# Patient Record
Sex: MALE | Race: WHITE | NOT HISPANIC OR LATINO | Employment: UNEMPLOYED | URBAN - METROPOLITAN AREA
[De-identification: names, ages, dates, MRNs, and addresses within clinical notes are randomized per-mention and may not be internally consistent; named-entity substitution may affect disease eponyms.]

---

## 2022-05-18 ENCOUNTER — OFFICE VISIT (OUTPATIENT)
Dept: FAMILY MEDICINE CLINIC | Facility: CLINIC | Age: 57
End: 2022-05-18
Payer: COMMERCIAL

## 2022-05-18 VITALS
OXYGEN SATURATION: 98 % | DIASTOLIC BLOOD PRESSURE: 76 MMHG | BODY MASS INDEX: 21.52 KG/M2 | WEIGHT: 142 LBS | TEMPERATURE: 97.6 F | SYSTOLIC BLOOD PRESSURE: 124 MMHG | RESPIRATION RATE: 16 BRPM | HEART RATE: 71 BPM | HEIGHT: 68 IN

## 2022-05-18 DIAGNOSIS — R41.3 MEMORY DEFICIT: Primary | ICD-10-CM

## 2022-05-18 DIAGNOSIS — J30.89 NON-SEASONAL ALLERGIC RHINITIS DUE TO OTHER ALLERGIC TRIGGER: ICD-10-CM

## 2022-05-18 DIAGNOSIS — Z87.891 PERSONAL HISTORY OF NICOTINE DEPENDENCE: ICD-10-CM

## 2022-05-18 DIAGNOSIS — Z12.2 ENCOUNTER FOR SCREENING FOR LUNG CANCER: ICD-10-CM

## 2022-05-18 DIAGNOSIS — Z00.00 HEALTHCARE MAINTENANCE: ICD-10-CM

## 2022-05-18 DIAGNOSIS — Z12.5 PROSTATE CANCER SCREENING: ICD-10-CM

## 2022-05-18 DIAGNOSIS — E78.49 OTHER HYPERLIPIDEMIA: ICD-10-CM

## 2022-05-18 PROCEDURE — 99203 OFFICE O/P NEW LOW 30 MIN: CPT | Performed by: FAMILY MEDICINE

## 2022-05-18 PROCEDURE — G0438 PPPS, INITIAL VISIT: HCPCS | Performed by: FAMILY MEDICINE

## 2022-05-18 RX ORDER — FEXOFENADINE HCL 180 MG/1
180 TABLET ORAL DAILY PRN
COMMUNITY

## 2022-05-18 RX ORDER — DONEPEZIL HYDROCHLORIDE 5 MG/1
TABLET, FILM COATED ORAL
COMMUNITY
Start: 2022-05-11 | End: 2022-05-23 | Stop reason: SDUPTHER

## 2022-05-18 RX ORDER — DIPHENOXYLATE HYDROCHLORIDE AND ATROPINE SULFATE 2.5; .025 MG/1; MG/1
1 TABLET ORAL DAILY
COMMUNITY

## 2022-05-18 NOTE — PROGRESS NOTES
Assessment and Plan:     Problem List Items Addressed This Visit        Respiratory    Allergic rhinitis due to allergen     Stable on Allegra  Continue same  Will continue to monitor  I will consider starting Flonase nasal spray if symptoms are not improving  Come back in 1 month  Other    Personal history of nicotine dependence      I am going to check chest CT for lung cancer screening  Relevant Orders    CT lung screening program    Encounter for screening for lung cancer    Relevant Orders    CT lung screening program    Healthcare maintenance     It was discussed about immunizations, diet, exercise and safety measures  Relevant Orders    CBC and differential    Comprehensive metabolic panel    Hemoglobin A1C    Lipid Panel with Direct LDL reflex    TSH, 3rd generation with Free T4 reflex    Prostate cancer screening    Relevant Orders    PSA, Total Screen    Memory deficit - Primary     Continue on Aricept  Will continue to monitor  Relevant Medications    donepezil (ARICEPT) 5 mg tablet    Other hyperlipidemia     Discussed about low-fat diet  I am going to check fasting lipid profile  Depression Screening and Follow-up Plan: Patient was screened for depression during today's encounter  They screened negative with a PHQ-2 score of 0  Preventive health issues were discussed with patient, and age appropriate screening tests were ordered as noted in patient's After Visit Summary  Personalized health advice and appropriate referrals for health education or preventive services given if needed, as noted in patient's After Visit Summary       History of Present Illness:     Patient presents for Medicare Annual Wellness visit    Patient Care Team:  Selvin Graham MD as PCP - General (Family Medicine)     Problem List:     Patient Active Problem List   Diagnosis    Personal history of nicotine dependence     Encounter for screening for lung cancer    Healthcare maintenance    Prostate cancer screening    Memory deficit    Other hyperlipidemia    Allergic rhinitis due to allergen      Past Medical and Surgical History:     Past Medical History:   Diagnosis Date    Brain trauma [facility])      Past Surgical History:   Procedure Laterality Date    KNEE SURGERY        Family History:     Family History   Problem Relation Age of Onset    Cancer Mother    [facility] Breast cancer Mother     Skin cancer Mother    [facility] Cancer Father     Depression Brother       Social History:     Social History     Socioeconomic History    Marital status: Single     Spouse name: None    Number of children: None    Years of education: None    Highest education level: None   Occupational History    None   Tobacco Use    Smoking status: Former Smoker     Packs/day: 1 00     Years: 25 00     Pack years: 25 00     Types: Cigarettes    Smokeless tobacco: Never Used   Vaping Use    Vaping Use: Never used   Substance and Sexual Activity    Alcohol use: Not Currently    Drug use: Not Currently     Types: Marijuana, "Crack" cocaine     Comment: 30 years ago    Sexual activity: None   Other Topics Concern    None   Social History Narrative    None     Social Determinants of Health     Financial Resource Strain: Not on file   Food Insecurity: Not on file   Transportation Needs: Not on file   Physical Activity: Not on file   Stress: Not on file   Social Connections: Not on file   Intimate Partner Violence: Not on file   Housing Stability: Not on file      Medications and Allergies:     Current Outpatient Medications   Medication Sig Dispense Refill    donepezil (ARICEPT) 5 mg tablet Take 1 tablet (5 mg total) by mouth daily at bedtime 90 tablet 1    fexofenadine (ALLEGRA) 180 MG tablet Take 180 mg by mouth as needed in the morning   multivitamin (THERAGRAN) TABS Take 1 tablet by mouth in the morning  No current facility-administered medications for this visit  Allergies   Allergen Reactions    Horse-Derived Products Other (See Comments)     As child        Immunizations: There is no immunization history on file for this patient  Health Maintenance:         Topic Date Due    Hepatitis C Screening  Never done    HIV Screening  Never done    Colorectal Cancer Screening  Never done         Topic Date Due    DTaP,Tdap,and Td Vaccines (1 - Tdap) Never done    COVID-19 Vaccine (4 - Booster for Mcmahan Peter series) 04/22/2022      Medicare Health Risk Assessment:     /76 (BP Location: Left arm, Patient Position: Sitting, Cuff Size: Adult)   Pulse 71   Temp 97 6 °F (36 4 °C) (Tympanic)   Resp 16   Ht 5' 8" (1 727 m)   Wt 64 4 kg (142 lb)   SpO2 98%   BMI 21 59 kg/m²      [facility] Well[facility] visit  Health Risk Assessment:   Patient rates overall health as very good  Patient feels that their physical health rating is slightly better  Patient is very satisfied with their life  Eyesight was rated as slightly worse  Hearing was rated as same  Patient feels that their emotional and mental health rating is slightly better  Patients states they are sometimes angry  Patient states they are sometimes unusually tired/fatigued  Pain experienced in the last 7 days has been none  Patient states that he has experienced no weight loss or gain in last 6 months  Depression Screening:   PHQ-2 Score: 0      Fall Risk Screening: In the past year, patient has experienced: no history of falling in past year      Home Safety:  Patient does not have trouble with stairs inside or outside of their home  Patient has working smoke alarms and has working carbon monoxide detector  Home safety hazards include: none  Nutrition:   Current diet is Regular  Medications:   Patient is currently taking over-the-counter supplements  OTC medications include: see medication list  Patient is able to manage medications       Activities of Daily Living (ADLs)/Instrumental Activities of Daily Living (IADLs):   Walk and transfer into and out of bed and chair?: Yes  Dress and groom yourself?: Yes    Bathe or shower yourself?: Yes    Feed yourself? Yes  Do your laundry/housekeeping?: Yes  Manage your money, pay your bills and track your expenses?: Yes  Make your own meals?: Yes    Do your own shopping?: Yes    Previous Hospitalizations:   Any hospitalizations or ED visits within the last 12 months?: No      Advance Care Planning:   Living will: No    Durable POA for healthcare: No    Advanced directive: No      Cognitive Screening:   Provider or family/friend/caregiver concerned regarding cognition?: No    PREVENTIVE SCREENINGS      Cardiovascular Screening:    General: Risks and Benefits Discussed    Due for: Lipid Panel      Diabetes Screening:     General: Risks and Benefits Discussed    Due for: Blood Glucose      Colorectal Cancer Screening:     General: Risks and Benefits Discussed    Due for: Colonoscopy - Low Risk      Prostate Cancer Screening:    General: Risks and Benefits Discussed    Due for: PSA      Osteoporosis Screening:    General: Screening Not Indicated      Abdominal Aortic Aneurysm (AAA) Screening:    Risk factors include: tobacco use        Lung Cancer Screening:     General: Screening Not Indicated      Hepatitis C Screening:    General: Risks and Benefits Discussed    Screening, Brief Intervention, and Referral to Treatment (SBIRT)    Screening  Typical number of drinks in a day: 0  Typical number of drinks in a week: 3  Interpretation: Low risk drinking behavior  Brief Intervention  Alcohol & drug use screenings were reviewed  No concerns regarding substance use disorder identified         Kellie Griffin MD

## 2022-05-23 PROBLEM — E78.49 OTHER HYPERLIPIDEMIA: Status: ACTIVE | Noted: 2022-05-23

## 2022-05-23 PROBLEM — R41.3 MEMORY DEFICIT: Status: ACTIVE | Noted: 2022-05-23

## 2022-05-23 PROBLEM — J30.9 ALLERGIC RHINITIS DUE TO ALLERGEN: Status: ACTIVE | Noted: 2022-05-23

## 2022-05-23 RX ORDER — DONEPEZIL HYDROCHLORIDE 5 MG/1
5 TABLET, FILM COATED ORAL
Qty: 90 TABLET | Refills: 1
Start: 2022-05-23 | End: 2022-06-15 | Stop reason: SDUPTHER

## 2022-05-23 NOTE — PROGRESS NOTES
Assessment/Plan:  Allergic rhinitis due to allergen  Stable on Allegra  Continue same  Will continue to monitor  I will consider starting Flonase nasal spray if symptoms are not improving  Come back in 1 month  Personal history of nicotine dependence   I am going to check chest CT for lung cancer screening  Other hyperlipidemia  Discussed about low-fat diet  I am going to check fasting lipid profile  Memory deficit  Continue on Aricept  Will continue to monitor  Healthcare maintenance  It was discussed about immunizations, diet, exercise and safety measures  Diagnoses and all orders for this visit:    Memory deficit  -     donepezil (ARICEPT) 5 mg tablet; Take 1 tablet (5 mg total) by mouth daily at bedtime    Encounter for screening for lung cancer  -     CT lung screening program; Future    Non-seasonal allergic rhinitis due to other allergic trigger    Prostate cancer screening  -     PSA, Total Screen; Future    Personal history of nicotine dependence   -     CT lung screening program; Future    Other hyperlipidemia    Healthcare maintenance  -     CBC and differential; Future  -     Comprehensive metabolic panel; Future  -     Hemoglobin A1C; Future  -     Lipid Panel with Direct LDL reflex; Future  -     TSH, 3rd generation with Free T4 reflex; Future    Other orders  -     Discontinue: donepezil (ARICEPT) 5 mg tablet; TAKE 1 TABLET BY MOUTH EVERY DAY AT NIGHT  -     fexofenadine (ALLEGRA) 180 MG tablet; Take 180 mg by mouth as needed in the morning   -     multivitamin (THERAGRAN) TABS; Take 1 tablet by mouth in the morning  There are no Patient Instructions on file for this visit  Return in about 1 month (around 6/18/2022)  Subjective:      Patient ID: Joe Ferguson is a 61 y o  male  Chief Complaint   Patient presents with   [facility]        He is here today as a new patient to establish  He is on Aricept for memory problems    He also has history of allergic rhinitis and he takes Allegra  He is due for blood work  He is here today with his brother  He denies any concern  The following portions of the patient's history were reviewed and updated as appropriate: allergies, current medications, past family history, past medical history, past social history, past surgical history and problem list     Review of Systems   Constitutional: Negative for chills and fever  HENT: Negative for trouble swallowing  Eyes: Negative for visual disturbance  Respiratory: Negative for cough and shortness of breath  Cardiovascular: Negative for chest pain and palpitations  Gastrointestinal: Negative for abdominal pain, blood in stool and vomiting  Endocrine: Negative for cold intolerance and heat intolerance  Genitourinary: Negative for difficulty urinating and dysuria  Musculoskeletal: Negative for gait problem  Skin: Negative for rash  Neurological: Negative for dizziness, syncope and headaches  Hematological: Negative for adenopathy  Psychiatric/Behavioral: Negative for behavioral problems  Current Outpatient Medications   Medication Sig Dispense Refill    donepezil (ARICEPT) 5 mg tablet Take 1 tablet (5 mg total) by mouth daily at bedtime 90 tablet 1    fexofenadine (ALLEGRA) 180 MG tablet Take 180 mg by mouth as needed in the morning   multivitamin (THERAGRAN) TABS Take 1 tablet by mouth in the morning  No current facility-administered medications for this visit  Objective:    /76 (BP Location: Left arm, Patient Position: Sitting, Cuff Size: Adult)   Pulse 71   Temp 97 6 °F (36 4 °C) (Tympanic)   Resp 16   Ht 5' 8" (1 727 m)   Wt 64 4 kg (142 lb)   SpO2 98%   BMI 21 59 kg/m²        Physical Exam  Vitals and nursing note reviewed  Constitutional:       Appearance: He is well-developed  HENT:      Head: Normocephalic and atraumatic  Eyes:      Pupils: Pupils are equal, round, and reactive to light  Cardiovascular:      Rate and Rhythm: Normal rate and regular rhythm  Heart sounds: Normal heart sounds  Pulmonary:      Effort: Pulmonary effort is normal       Breath sounds: Normal breath sounds  Abdominal:      General: Bowel sounds are normal       Palpations: Abdomen is soft  Musculoskeletal:         General: Normal range of motion  Cervical back: Normal range of motion and neck supple  Lymphadenopathy:      Cervical: No cervical adenopathy  Skin:     General: Skin is warm  Findings: No rash  Neurological:      Mental Status: He is alert and oriented to person, place, and time  Cranial Nerves: No cranial nerve deficit                  Navi Lopez MD

## 2022-05-23 NOTE — ASSESSMENT & PLAN NOTE
Stable on Allegra  Continue same  Will continue to monitor  I will consider starting Flonase nasal spray if symptoms are not improving  Come back in 1 month

## 2022-06-15 ENCOUNTER — OFFICE VISIT (OUTPATIENT)
Dept: FAMILY MEDICINE CLINIC | Facility: CLINIC | Age: 57
End: 2022-06-15
Payer: COMMERCIAL

## 2022-06-15 ENCOUNTER — TELEPHONE (OUTPATIENT)
Dept: ADMINISTRATIVE | Facility: OTHER | Age: 57
End: 2022-06-15

## 2022-06-15 VITALS
OXYGEN SATURATION: 98 % | TEMPERATURE: 97.8 F | WEIGHT: 141 LBS | DIASTOLIC BLOOD PRESSURE: 76 MMHG | HEIGHT: 68 IN | SYSTOLIC BLOOD PRESSURE: 120 MMHG | BODY MASS INDEX: 21.37 KG/M2 | HEART RATE: 57 BPM | RESPIRATION RATE: 16 BRPM

## 2022-06-15 DIAGNOSIS — R41.3 MEMORY DEFICIT: Primary | ICD-10-CM

## 2022-06-15 DIAGNOSIS — E78.49 OTHER HYPERLIPIDEMIA: ICD-10-CM

## 2022-06-15 DIAGNOSIS — J30.89 NON-SEASONAL ALLERGIC RHINITIS DUE TO OTHER ALLERGIC TRIGGER: ICD-10-CM

## 2022-06-15 PROCEDURE — 99214 OFFICE O/P EST MOD 30 MIN: CPT | Performed by: FAMILY MEDICINE

## 2022-06-15 PROCEDURE — 3008F BODY MASS INDEX DOCD: CPT | Performed by: FAMILY MEDICINE

## 2022-06-15 PROCEDURE — 1036F TOBACCO NON-USER: CPT | Performed by: FAMILY MEDICINE

## 2022-06-15 RX ORDER — DONEPEZIL HYDROCHLORIDE 10 MG/1
10 TABLET, FILM COATED ORAL
Qty: 90 TABLET | Refills: 1 | Status: SHIPPED | OUTPATIENT
Start: 2022-06-15

## 2022-06-15 NOTE — ASSESSMENT & PLAN NOTE
Not well controlled  I am going to increase Aricept to 10 mg daily  Discussed about possible side effects  I will consider refer back to neurologist if needed

## 2022-06-15 NOTE — TELEPHONE ENCOUNTER
Upon review of the In Basket request we were able to locate, review, and update the patient chart as requested for CRC: Colonoscopy  Any additional questions or concerns should be emailed to the Practice Liaisons via [email or web address] email, please do not reply via In Basket      Thank you  Rosemarie Martinez

## 2022-06-15 NOTE — TELEPHONE ENCOUNTER
----- Message from Walter Acosta sent at 6/15/2022 11:24 AM EDT -----  Regarding: colonoscopy  06/15/22 11:27 AM    Hello, our patient Ayush Thacker has had CRC: Colonoscopy completed/performed  Please assist in updating the patient chart by pulling the Care Everywhere (CE) document  The date of service is 20/18       Thank you,  Walter Acosta  [facility] [id number] [facility]

## 2022-06-15 NOTE — PROGRESS NOTES
Assessment/Plan:  Memory deficit  Not well controlled  I am going to increase Aricept to 10 mg daily  Discussed about possible side effects  I will consider refer back to neurologist if needed  Other hyperlipidemia  Not well controlled  Discussed about low-fat diet and regular exercise  I am going to check his fasting lipid profile  Allergic rhinitis due to allergen  Stable  Continue same  Will continue to monitor  Diagnoses and all orders for this visit:    Memory deficit  -     donepezil (ARICEPT) 10 mg tablet; Take 1 tablet (10 mg total) by mouth daily at bedtime    Other hyperlipidemia    Non-seasonal allergic rhinitis due to other allergic trigger        There are no Patient Instructions on file for this visit  Return in about 6 months (around 12/15/2022)  Subjective:      Patient ID: Tennille Shepherd is a 61 y o  male  Chief Complaint   Patient presents with    Allergies     1 month follow up     Screenings     Colonoscopy done in 2018 at [address] [place] [address] [facility]       Here today for follow-up multiple medical problems  His blood work was done but was not completed  His PSA and TSH came back normal   His lipid profile was not done  His brother stated that he has been having problems his memory forgetting more than before  He was started on Aricept 5 mg daily few months ago and he was doing well  He stated neurologist was planning to increase it to 10 mg but that was not done  The following portions of the patient's history were reviewed and updated as appropriate: allergies, current medications, past family history, past medical history, past social history, past surgical history and problem list     Review of Systems   Constitutional: Negative for chills and fever  HENT: Negative for trouble swallowing  Eyes: Negative for visual disturbance  Respiratory: Negative for cough and shortness of breath  Cardiovascular: Negative for chest pain and palpitations  Gastrointestinal: Negative for abdominal pain, blood in stool and vomiting  Endocrine: Negative for cold intolerance and heat intolerance  Genitourinary: Negative for difficulty urinating and dysuria  Musculoskeletal: Negative for gait problem  Skin: Negative for rash  Neurological: Negative for dizziness, syncope and headaches  Hematological: Negative for adenopathy  Psychiatric/Behavioral: Positive for decreased concentration  Negative for behavioral problems  Current Outpatient Medications   Medication Sig Dispense Refill    donepezil (ARICEPT) 10 mg tablet Take 1 tablet (10 mg total) by mouth daily at bedtime 90 tablet 1    fexofenadine (ALLEGRA) 180 MG tablet Take 180 mg by mouth as needed in the morning   multivitamin (THERAGRAN) TABS Take 1 tablet by mouth in the morning  No current facility-administered medications for this visit  Objective:    /76 (BP Location: Left arm, Patient Position: Sitting, Cuff Size: Large)   Pulse 57   Temp 97 8 °F (36 6 °C) (Tympanic)   Resp 16   Ht 5' 8" (1 727 m)   Wt 64 kg (141 lb)   SpO2 98%   BMI 21 44 kg/m²        Physical Exam  Vitals and nursing note reviewed  Constitutional:       Appearance: He is well-developed  HENT:      Head: Normocephalic and atraumatic  Eyes:      Pupils: Pupils are equal, round, and reactive to light  Cardiovascular:      Rate and Rhythm: Normal rate and regular rhythm  Heart sounds: Normal heart sounds  Pulmonary:      Effort: Pulmonary effort is normal       Breath sounds: Normal breath sounds  Abdominal:      General: Bowel sounds are normal       Palpations: Abdomen is soft  Musculoskeletal:         General: Normal range of motion  Cervical back: Normal range of motion and neck supple  Lymphadenopathy:      Cervical: No cervical adenopathy  Skin:     General: Skin is warm  Findings: No rash     Neurological:      Mental Status: He is alert and oriented to person, place, and time  Cranial Nerves: No cranial nerve deficit                  Keeley Eduardo MD

## 2022-06-15 NOTE — ASSESSMENT & PLAN NOTE
Not well controlled  Discussed about low-fat diet and regular exercise  I am going to check his fasting lipid profile

## 2022-06-22 ENCOUNTER — APPOINTMENT (OUTPATIENT)
Dept: LAB | Age: 57
End: 2022-06-22
Payer: COMMERCIAL

## 2022-06-22 ENCOUNTER — HOSPITAL ENCOUNTER (OUTPATIENT)
Dept: RADIOLOGY | Facility: IMAGING CENTER | Age: 57
Discharge: HOME | End: 2022-06-22
Payer: COMMERCIAL

## 2022-06-22 DIAGNOSIS — Z12.5 PROSTATE CANCER SCREENING: ICD-10-CM

## 2022-06-22 DIAGNOSIS — Z00.00 HEALTHCARE MAINTENANCE: ICD-10-CM

## 2022-06-22 DIAGNOSIS — Z12.2 ENCOUNTER FOR SCREENING FOR LUNG CANCER: ICD-10-CM

## 2022-06-22 DIAGNOSIS — Z87.891 PERSONAL HISTORY OF NICOTINE DEPENDENCE: ICD-10-CM

## 2022-06-22 LAB
ALBUMIN SERPL BCP-MCNC: 3.7 G/DL (ref 3.5–5)
ALP SERPL-CCNC: 84 U/L (ref 46–116)
ALT SERPL W P-5'-P-CCNC: 43 U/L (ref 12–78)
ANION GAP SERPL CALCULATED.3IONS-SCNC: 5 MMOL/L (ref 4–13)
AST SERPL W P-5'-P-CCNC: 26 U/L (ref 5–45)
BASOPHILS # BLD AUTO: 0.05 THOUSANDS/ΜL (ref 0–0.1)
BASOPHILS NFR BLD AUTO: 1 % (ref 0–1)
BILIRUB SERPL-MCNC: 0.68 MG/DL (ref 0.2–1)
BUN SERPL-MCNC: 17 MG/DL (ref 5–25)
CALCIUM SERPL-MCNC: 9.6 MG/DL (ref 8.3–10.1)
CHLORIDE SERPL-SCNC: 107 MMOL/L (ref 100–108)
CHOLEST SERPL-MCNC: 197 MG/DL
CO2 SERPL-SCNC: 26 MMOL/L (ref 21–32)
CREAT SERPL-MCNC: 0.96 MG/DL (ref 0.6–1.3)
EOSINOPHIL # BLD AUTO: 0.18 THOUSAND/ΜL (ref 0–0.61)
EOSINOPHIL NFR BLD AUTO: 3 % (ref 0–6)
ERYTHROCYTE [DISTWIDTH] IN BLOOD BY AUTOMATED COUNT: 13.4 % (ref 11.6–15.1)
EST. AVERAGE GLUCOSE BLD GHB EST-MCNC: 111 MG/DL
GFR SERPL CREATININE-BSD FRML MDRD: 87 ML/MIN/1.73SQ M
GLUCOSE P FAST SERPL-MCNC: 92 MG/DL (ref 65–99)
HBA1C MFR BLD: 5.5 %
HCT VFR BLD AUTO: 48.8 % (ref 36.5–49.3)
HDLC SERPL-MCNC: 51 MG/DL
HGB BLD-MCNC: 14.8 G/DL (ref 12–17)
IMM GRANULOCYTES # BLD AUTO: 0.01 THOUSAND/UL (ref 0–0.2)
IMM GRANULOCYTES NFR BLD AUTO: 0 % (ref 0–2)
LDLC SERPL CALC-MCNC: 132 MG/DL (ref 0–100)
LYMPHOCYTES # BLD AUTO: 1.48 THOUSANDS/ΜL (ref 0.6–4.47)
LYMPHOCYTES NFR BLD AUTO: 27 % (ref 14–44)
MCH RBC QN AUTO: 29.3 PG (ref 26.8–34.3)
MCHC RBC AUTO-ENTMCNC: 30.3 G/DL (ref 31.4–37.4)
MCV RBC AUTO: 97 FL (ref 82–98)
MONOCYTES # BLD AUTO: 0.52 THOUSAND/ΜL (ref 0.17–1.22)
MONOCYTES NFR BLD AUTO: 9 % (ref 4–12)
NEUTROPHILS # BLD AUTO: 3.28 THOUSANDS/ΜL (ref 1.85–7.62)
NEUTS SEG NFR BLD AUTO: 60 % (ref 43–75)
NRBC BLD AUTO-RTO: 0 /100 WBCS
PLATELET # BLD AUTO: 169 THOUSANDS/UL (ref 149–390)
PMV BLD AUTO: 12.1 FL (ref 8.9–12.7)
POTASSIUM SERPL-SCNC: 4.7 MMOL/L (ref 3.5–5.3)
PROT SERPL-MCNC: 7.2 G/DL (ref 6.4–8.2)
PSA SERPL-MCNC: 2 NG/ML (ref 0–4)
RBC # BLD AUTO: 5.05 MILLION/UL (ref 3.88–5.62)
SODIUM SERPL-SCNC: 138 MMOL/L (ref 136–145)
TRIGL SERPL-MCNC: 70 MG/DL
TSH SERPL DL<=0.05 MIU/L-ACNC: 1.92 UIU/ML (ref 0.45–4.5)
WBC # BLD AUTO: 5.52 THOUSAND/UL (ref 4.31–10.16)

## 2022-06-22 PROCEDURE — G0103 PSA SCREENING: HCPCS

## 2022-06-22 PROCEDURE — 71271 CT THORAX LUNG CANCER SCR C-: CPT

## 2022-06-22 PROCEDURE — 36415 COLL VENOUS BLD VENIPUNCTURE: CPT

## 2022-06-22 PROCEDURE — 83036 HEMOGLOBIN GLYCOSYLATED A1C: CPT

## 2022-06-22 PROCEDURE — 80053 COMPREHEN METABOLIC PANEL: CPT

## 2022-06-22 PROCEDURE — 85025 COMPLETE CBC W/AUTO DIFF WBC: CPT

## 2022-06-22 PROCEDURE — 80061 LIPID PANEL: CPT

## 2022-06-22 PROCEDURE — 84443 ASSAY THYROID STIM HORMONE: CPT

## 2022-06-24 ENCOUNTER — TELEPHONE (OUTPATIENT)
Dept: FAMILY MEDICINE CLINIC | Facility: CLINIC | Age: 57
End: 2022-06-24

## 2022-06-24 DIAGNOSIS — E78.49 OTHER HYPERLIPIDEMIA: Primary | ICD-10-CM

## 2022-06-24 NOTE — TELEPHONE ENCOUNTER
----- Message from Elie Orlando on behalf of Tennille Shepherd sent at 6/23/2022 12:20 PM EDT -----  Regarding: Question regarding LIPID PANEL WITH DIRECT LDL REFLEX  This message is being sent by Elie Orlando on behalf of Tennille Shepherd  Will Mike need to be put on a Statin to control cholesterol? High cholesterol does run on maternal & paternal side

## 2022-06-27 DIAGNOSIS — E78.49 OTHER HYPERLIPIDEMIA: Primary | ICD-10-CM

## 2022-06-27 RX ORDER — ROSUVASTATIN CALCIUM 5 MG/1
5 TABLET, COATED ORAL DAILY
Qty: 90 TABLET | Refills: 3 | Status: SHIPPED | OUTPATIENT
Start: 2022-06-27 | End: 2022-12-14 | Stop reason: SDUPTHER

## 2022-06-27 NOTE — TELEPHONE ENCOUNTER
Yobani aware of lab results and Crestor sent to pharmacy  Fasting lab order placed and Danial Bender will call back to sched 3 mo appt

## 2022-06-27 NOTE — TELEPHONE ENCOUNTER
His blood work showed elevated LDL  All the rest of the blood work came back normal   I sent prescription for Crestor 5 mg 1 tablet at bedtime  I recommend repeat blood work for fasting lipid profile and CMP in 3 months with appointment

## 2022-06-28 ENCOUNTER — TELEPHONE (OUTPATIENT)
Dept: FAMILY MEDICINE CLINIC | Facility: CLINIC | Age: 57
End: 2022-06-28

## 2022-06-28 NOTE — TELEPHONE ENCOUNTER
----- Message from Diane Gaming MD sent at 6/28/2022  9:01 AM EDT -----  Lung CT came back benign  Continue routine screening in 1 year

## 2022-10-11 PROBLEM — Z12.5 PROSTATE CANCER SCREENING: Status: RESOLVED | Noted: 2022-05-18 | Resolved: 2022-10-11

## 2022-10-11 PROBLEM — Z00.00 HEALTHCARE MAINTENANCE: Status: RESOLVED | Noted: 2022-05-18 | Resolved: 2022-10-11

## 2022-11-21 DIAGNOSIS — R41.3 MEMORY DEFICIT: ICD-10-CM

## 2022-11-21 RX ORDER — DONEPEZIL HYDROCHLORIDE 10 MG/1
TABLET, FILM COATED ORAL
Qty: 90 TABLET | Refills: 0 | Status: SHIPPED | OUTPATIENT
Start: 2022-11-21

## 2022-11-27 ENCOUNTER — RA CDI HCC (OUTPATIENT)
Dept: OTHER | Facility: HOSPITAL | Age: 57
End: 2022-11-27

## 2022-11-27 NOTE — PROGRESS NOTES
[facility]  coding opportunities       Chart reviewed, no opportunity found:   [address]        Patients Insurance     Medicare Insurance: [employer]

## 2022-12-05 ENCOUNTER — TELEPHONE (OUTPATIENT)
Dept: OTHER | Facility: OTHER | Age: 57
End: 2022-12-05

## 2022-12-05 NOTE — TELEPHONE ENCOUNTER
Patient's brother is not feeling well today so he is not able to bring him to his appointment and would like it rescheduled  Appointment moved to 12/14/22

## 2022-12-14 ENCOUNTER — OFFICE VISIT (OUTPATIENT)
Dept: FAMILY MEDICINE CLINIC | Facility: CLINIC | Age: 57
End: 2022-12-14

## 2022-12-14 ENCOUNTER — TELEPHONE (OUTPATIENT)
Dept: ADMINISTRATIVE | Facility: OTHER | Age: 57
End: 2022-12-14

## 2022-12-14 DIAGNOSIS — Z12.5 PROSTATE CANCER SCREENING: ICD-10-CM

## 2022-12-14 DIAGNOSIS — J30.89 NON-SEASONAL ALLERGIC RHINITIS DUE TO OTHER ALLERGIC TRIGGER: ICD-10-CM

## 2022-12-14 DIAGNOSIS — R41.3 MEMORY DEFICIT: ICD-10-CM

## 2022-12-14 DIAGNOSIS — E78.49 OTHER HYPERLIPIDEMIA: Primary | ICD-10-CM

## 2022-12-14 DIAGNOSIS — Z23 IMMUNIZATION DUE: ICD-10-CM

## 2022-12-14 RX ORDER — ROSUVASTATIN CALCIUM 5 MG/1
5 TABLET, COATED ORAL DAILY
Qty: 90 TABLET | Refills: 3 | Status: SHIPPED | OUTPATIENT
Start: 2022-12-14

## 2022-12-14 RX ORDER — DONEPEZIL HYDROCHLORIDE 10 MG/1
10 TABLET, FILM COATED ORAL
Qty: 90 TABLET | Refills: 1 | Status: SHIPPED | OUTPATIENT
Start: 2022-12-14

## 2022-12-14 NOTE — ASSESSMENT & PLAN NOTE
Let's get him set up for an appointment, but given I am out of the office next week it might be prudent for him to come in to get titers so we know if he needs more doses of vaccine. I think we would just need the MMR titers given his varicella titer was immune in 2015 although it depends on the requirements from [facility].    Stable  Continue on Allegra  Continue to monitor

## 2022-12-14 NOTE — TELEPHONE ENCOUNTER
Upon review of the In Basket request we were able to locate, review, and update the patient chart as requested for CRC: Colonoscopy  Any additional questions or concerns should be emailed to the Practice Liaisons via the appropriate education email address, please do not reply via In Basket      Thank you  Everette Cushing

## 2022-12-14 NOTE — PROGRESS NOTES
Name: Alfredo Schulte      : 1965      MRN: [id number]  Encounter Provider: Donta Shelton MD  Encounter Date: 2022   Encounter department: [address] [place] [address]  Other hyperlipidemia  Assessment & Plan:  Component      Latest Ref Rng & Units 2022   Cholesterol      See Comment mg/dL 197   Triglycerides      See Comment mg/dL 70   HDL      >=40 mg/dL 51   LDL Calculated      0 - 100 mg/dL 132 (H)   Not well controlled  He was not taking his medication  He was told to take his Crestor as prescribed  Repeat blood work in 3 months  Orders:  -     CBC and differential; Future  -     Comprehensive metabolic panel; Future  -     Lipid Panel with Direct LDL reflex; Future  -     TSH, 3rd generation with Free T4 reflex; Future  -     rosuvastatin (CRESTOR) 5 mg tablet; Take 1 tablet (5 mg total) by mouth daily    2  Memory deficit  Assessment & Plan:  Stable on Aricept  We will continue to monitor  Orders:  -     donepezil (ARICEPT) 10 mg tablet; Take 1 tablet (10 mg total) by mouth daily at bedtime    3  Prostate cancer screening  -     PSA, Total Screen; Future    4  Immunization due  -     influenza vaccine, quadrivalent, recombinant, PF, 0 5 mL, for patients 18 yr+ (FLUBLOK)    5  Non-seasonal allergic rhinitis due to other allergic trigger  Assessment & Plan:  Stable  Continue on Allegra  Continue to monitor  Subjective     Is here today for follow-up multiple medical problems  His cholesterol was elevated last time he had blood work  He has not been taking his Crestor as prescribed  He continues on Aricept and is doing well on it  Stable  Continue on    Review of Systems   Constitutional: Negative for chills and fever  HENT: Negative for trouble swallowing  Eyes: Negative for visual disturbance  Respiratory: Negative for cough and shortness of breath  Cardiovascular: Negative for chest pain and palpitations  Gastrointestinal: Negative for abdominal pain, blood in stool and vomiting  Endocrine: Negative for cold intolerance and heat intolerance  Genitourinary: Negative for difficulty urinating and dysuria  Musculoskeletal: Negative for gait problem  Skin: Negative for rash  Neurological: Negative for dizziness, syncope and headaches  Hematological: Negative for adenopathy  Psychiatric/Behavioral: Negative for behavioral problems  Past Medical History:   Diagnosis Date   • Brain trauma      Past Surgical History:   Procedure Laterality Date   • KNEE SURGERY       Family History   Problem Relation Age of Onset   • Cancer Mother    • Breast cancer Mother    • Skin cancer Mother    • Cancer Father    • Depression Brother      Social History     Socioeconomic History   • Marital status: Single     Spouse name: None   • Number of children: None   • Years of education: None   • Highest education level: None   Occupational History   • None   Tobacco Use   • Smoking status: Former     Packs/day: 1 00     Years: 25 00     Pack years: 25 00     Types: Cigarettes   • Smokeless tobacco: Never   Vaping Use   • Vaping Use: Never used   Substance and Sexual Activity   • Alcohol use: Not Currently   • Drug use: Not Currently     Types: Marijuana, "Crack" cocaine     Comment: 30 years ago   • Sexual activity: None   Other Topics Concern   • None   Social History Narrative   • None     Social Determinants of Health     Financial Resource Strain: Not on file   Food Insecurity: Not on file   Transportation Needs: Not on file   Physical Activity: Not on file   Stress: Not on file   Social Connections: Not on file   Intimate Partner Violence: Not on file   Housing Stability: Not on file     Current Outpatient Medications on File Prior to Visit   Medication Sig   • fexofenadine (ALLEGRA) 180 MG tablet Take 180 mg by mouth as needed in the morning  • multivitamin (THERAGRAN) TABS Take 1 tablet by mouth in the morning     • [DISCONTINUED] donepezil (ARICEPT) 10 mg tablet TAKE 1 TABLET BY MOUTH DAILY AT BEDTIME   • [DISCONTINUED] rosuvastatin (CRESTOR) 5 mg tablet Take 1 tablet (5 mg total) by mouth daily     Allergies   Allergen Reactions   • Horse-Derived Products Other (See Comments)     As child       Immunization History   Administered Date(s) Administered   • COVID-19 PFIZER VACCINE 0 3 ML IM 04/20/2021, 05/11/2021, 12/22/2021   • COVID-19 Pfizer Vac BIVALENT Adolfo-sucrose 12 Yr+ IM (BOOSTER ONLY) 09/30/2022   • H1N1, All Formulations 01/08/2010   • INFLUENZA 10/19/2006, 10/11/2007, 10/21/2008, 09/23/2009, 10/28/2010, 10/04/2011, 09/03/2013, 09/25/2015, 10/20/2016, 09/19/2017, 11/06/2018, 01/11/2021, 11/12/2021   • Influenza Quadrivalent Preservative Free 3 years and older IM 09/25/2015, 10/20/2016, 09/19/2017, 11/06/2018, 01/11/2021, 11/12/2021   • Influenza, recombinant, quadrivalent,injectable, preservative free 12/14/2022   • Pneumococcal Polysaccharide PPV23 11/06/2018   • Td (adult), Unspecified 01/01/2003   • Zoster Vaccine Recombinant 01/11/2021, 03/30/2021       Objective     There were no vitals taken for this visit  Physical Exam  Vitals and nursing note reviewed  Constitutional:       Appearance: He is well-developed  HENT:      Head: Normocephalic and atraumatic  Eyes:      Pupils: Pupils are equal, round, and reactive to light  Cardiovascular:      Rate and Rhythm: Normal rate and regular rhythm  Heart sounds: Normal heart sounds  Pulmonary:      Effort: Pulmonary effort is normal       Breath sounds: Normal breath sounds  Abdominal:      General: Bowel sounds are normal       Palpations: Abdomen is soft  Musculoskeletal:         General: Normal range of motion  Cervical back: Normal range of motion and neck supple  Lymphadenopathy:      Cervical: No cervical adenopathy  Skin:     General: Skin is warm  Findings: No rash  Neurological:      Mental Status: He is alert   Mental status is at baseline  Cranial Nerves: No cranial nerve deficit         Lena Palencia MD

## 2022-12-14 NOTE — ASSESSMENT & PLAN NOTE
Component      Latest Ref Rng & Units 6/22/2022   Cholesterol      See Comment mg/dL 197   Triglycerides      See Comment mg/dL 70   HDL      >=40 mg/dL 51   LDL Calculated      0 - 100 mg/dL 132 (H)   Not well controlled  He was not taking his medication  He was told to take his Crestor as prescribed  Repeat blood work in 3 months

## 2023-02-23 ENCOUNTER — APPOINTMENT (OUTPATIENT)
Dept: LAB | Age: 58
End: 2023-02-23

## 2023-02-23 DIAGNOSIS — Z12.5 PROSTATE CANCER SCREENING: ICD-10-CM

## 2023-02-23 DIAGNOSIS — E78.49 OTHER HYPERLIPIDEMIA: ICD-10-CM

## 2023-02-23 LAB
ALBUMIN SERPL BCP-MCNC: 3.9 G/DL (ref 3.5–5)
ALP SERPL-CCNC: 77 U/L (ref 46–116)
ALT SERPL W P-5'-P-CCNC: 53 U/L (ref 12–78)
ANION GAP SERPL CALCULATED.3IONS-SCNC: 3 MMOL/L (ref 4–13)
AST SERPL W P-5'-P-CCNC: 40 U/L (ref 5–45)
BASOPHILS # BLD AUTO: 0.06 THOUSANDS/ÂΜL (ref 0–0.1)
BASOPHILS NFR BLD AUTO: 1 % (ref 0–1)
BILIRUB SERPL-MCNC: 0.44 MG/DL (ref 0.2–1)
BUN SERPL-MCNC: 17 MG/DL (ref 5–25)
CALCIUM SERPL-MCNC: 9.8 MG/DL (ref 8.3–10.1)
CHLORIDE SERPL-SCNC: 112 MMOL/L (ref 96–108)
CHOLEST SERPL-MCNC: 161 MG/DL
CO2 SERPL-SCNC: 25 MMOL/L (ref 21–32)
CREAT SERPL-MCNC: 0.94 MG/DL (ref 0.6–1.3)
EOSINOPHIL # BLD AUTO: 0.12 THOUSAND/ÂΜL (ref 0–0.61)
EOSINOPHIL NFR BLD AUTO: 3 % (ref 0–6)
ERYTHROCYTE [DISTWIDTH] IN BLOOD BY AUTOMATED COUNT: 13.1 % (ref 11.6–15.1)
GFR SERPL CREATININE-BSD FRML MDRD: 89 ML/MIN/1.73SQ M
GLUCOSE P FAST SERPL-MCNC: 99 MG/DL (ref 65–99)
HCT VFR BLD AUTO: 48.1 % (ref 36.5–49.3)
HDLC SERPL-MCNC: 63 MG/DL
HGB BLD-MCNC: 14.2 G/DL (ref 12–17)
IMM GRANULOCYTES # BLD AUTO: 0 THOUSAND/UL (ref 0–0.2)
IMM GRANULOCYTES NFR BLD AUTO: 0 % (ref 0–2)
LDLC SERPL CALC-MCNC: 85 MG/DL (ref 0–100)
LYMPHOCYTES # BLD AUTO: 1.51 THOUSANDS/ÂΜL (ref 0.6–4.47)
LYMPHOCYTES NFR BLD AUTO: 32 % (ref 14–44)
MCH RBC QN AUTO: 28.9 PG (ref 26.8–34.3)
MCHC RBC AUTO-ENTMCNC: 29.5 G/DL (ref 31.4–37.4)
MCV RBC AUTO: 98 FL (ref 82–98)
MONOCYTES # BLD AUTO: 0.47 THOUSAND/ÂΜL (ref 0.17–1.22)
MONOCYTES NFR BLD AUTO: 10 % (ref 4–12)
NEUTROPHILS # BLD AUTO: 2.61 THOUSANDS/ÂΜL (ref 1.85–7.62)
NEUTS SEG NFR BLD AUTO: 54 % (ref 43–75)
NRBC BLD AUTO-RTO: 0 /100 WBCS
PLATELET # BLD AUTO: 211 THOUSANDS/UL (ref 149–390)
PMV BLD AUTO: 11.9 FL (ref 8.9–12.7)
POTASSIUM SERPL-SCNC: 5.1 MMOL/L (ref 3.5–5.3)
PROT SERPL-MCNC: 7.4 G/DL (ref 6.4–8.4)
PSA SERPL-MCNC: 2.7 NG/ML (ref 0–4)
RBC # BLD AUTO: 4.91 MILLION/UL (ref 3.88–5.62)
SODIUM SERPL-SCNC: 140 MMOL/L (ref 135–147)
TRIGL SERPL-MCNC: 64 MG/DL
TSH SERPL DL<=0.05 MIU/L-ACNC: 2.68 UIU/ML (ref 0.45–4.5)
WBC # BLD AUTO: 4.77 THOUSAND/UL (ref 4.31–10.16)

## 2023-03-20 ENCOUNTER — TELEPHONE (OUTPATIENT)
Dept: FAMILY MEDICINE CLINIC | Facility: CLINIC | Age: 58
End: 2023-03-20

## 2023-03-20 NOTE — TELEPHONE ENCOUNTER
----- Message from Shayna Rodgers MD sent at 3/19/2023  6:52 PM EDT -----  Blood work came back normal

## 2023-05-16 DIAGNOSIS — R41.3 MEMORY DEFICIT: ICD-10-CM

## 2023-05-16 RX ORDER — DONEPEZIL HYDROCHLORIDE 10 MG/1
TABLET, FILM COATED ORAL
Qty: 90 TABLET | Refills: 0 | Status: SHIPPED | OUTPATIENT
Start: 2023-05-16

## 2023-06-14 ENCOUNTER — OFFICE VISIT (OUTPATIENT)
Dept: FAMILY MEDICINE CLINIC | Facility: CLINIC | Age: 58
End: 2023-06-14
Payer: MEDICARE

## 2023-06-14 VITALS
DIASTOLIC BLOOD PRESSURE: 70 MMHG | SYSTOLIC BLOOD PRESSURE: 118 MMHG | TEMPERATURE: 97.5 F | BODY MASS INDEX: 20.92 KG/M2 | RESPIRATION RATE: 16 BRPM | OXYGEN SATURATION: 97 % | HEART RATE: 58 BPM | WEIGHT: 138 LBS | HEIGHT: 68 IN

## 2023-06-14 DIAGNOSIS — J30.89 NON-SEASONAL ALLERGIC RHINITIS DUE TO OTHER ALLERGIC TRIGGER: ICD-10-CM

## 2023-06-14 DIAGNOSIS — Z00.00 MEDICARE ANNUAL WELLNESS VISIT, SUBSEQUENT: ICD-10-CM

## 2023-06-14 DIAGNOSIS — R41.3 MEMORY DEFICIT: ICD-10-CM

## 2023-06-14 DIAGNOSIS — E78.49 OTHER HYPERLIPIDEMIA: Primary | ICD-10-CM

## 2023-06-14 PROCEDURE — 99214 OFFICE O/P EST MOD 30 MIN: CPT | Performed by: FAMILY MEDICINE

## 2023-06-14 PROCEDURE — G0438 PPPS, INITIAL VISIT: HCPCS | Performed by: FAMILY MEDICINE

## 2023-06-14 RX ORDER — DONEPEZIL HYDROCHLORIDE 10 MG/1
10 TABLET, FILM COATED ORAL
Qty: 90 TABLET | Refills: 1 | Status: SHIPPED | OUTPATIENT
Start: 2023-06-14

## 2023-06-14 RX ORDER — ROSUVASTATIN CALCIUM 5 MG/1
5 TABLET, COATED ORAL DAILY
Qty: 90 TABLET | Refills: 1 | Status: SHIPPED | OUTPATIENT
Start: 2023-06-14

## 2023-06-14 NOTE — PROGRESS NOTES
Assessment and Plan:     Problem List Items Addressed This Visit        Respiratory    Allergic rhinitis due to allergen     Stable on Allegra  Continue same  We will continue to monitor  Other    Memory deficit     Fair control on Aricept  Continue same  We will continue to monitor  Relevant Medications    donepezil (ARICEPT) 10 mg tablet    Other hyperlipidemia - Primary     Well-controlled  Continue on Crestor 5 mg daily  Continue to monitor fasting lipid profile  Relevant Medications    rosuvastatin (CRESTOR) 5 mg tablet    Other Relevant Orders    CBC and differential    Comprehensive metabolic panel    Lipid Panel with Direct LDL reflex    TSH, 3rd generation with Free T4 reflex    Medicare annual wellness visit, subsequent     It was discussed about immunizations, diet, exercise and safety measures  Preventive health issues were discussed with patient, and age appropriate screening tests were ordered as noted in patient's After Visit Summary  Personalized health advice and appropriate referrals for health education or preventive services given if needed, as noted in patient's After Visit Summary  History of Present Illness:     Patient presents for a Medicare Wellness Visit    Is here today for follow-up multiple medical problems  He has been taking his medications  Denies any side effect  He did not get his blood work done yet  Patient Care Team:  Will Teixeira MD as PCP - General (Family Medicine)     Review of Systems:     Review of Systems   Constitutional: Negative for chills and fever  HENT: Negative for trouble swallowing  Eyes: Negative for visual disturbance  Respiratory: Negative for cough and shortness of breath  Cardiovascular: Negative for chest pain and palpitations  Gastrointestinal: Negative for abdominal pain, blood in stool and vomiting  Endocrine: Negative for cold intolerance and heat intolerance     Genitourinary: "Negative for difficulty urinating and dysuria  Musculoskeletal: Negative for gait problem  Skin: Negative for rash  Neurological: Negative for dizziness, syncope and headaches  Hematological: Negative for adenopathy  Psychiatric/Behavioral: Negative for behavioral problems          Problem List:     Patient Active Problem List   Diagnosis   • Personal history of nicotine dependence    • Encounter for screening for lung cancer   • Memory deficit   • Other hyperlipidemia   • Allergic rhinitis due to allergen   • Medicare annual wellness visit, subsequent      Past Medical and Surgical History:     Past Medical History:   Diagnosis Date   • Brain trauma [facility])      Past Surgical History:   Procedure Laterality Date   • KNEE SURGERY        Family History:     Family History   Problem Relation Age of Onset   • Cancer Mother    • Breast cancer Mother    • Skin cancer Mother    • Cancer Father    • Depression Brother       Social History:     Social History     Socioeconomic History   • Marital status: Single     Spouse name: None   • Number of children: None   • Years of education: None   • Highest education level: None   Occupational History   • None   Tobacco Use   • Smoking status: Former     Packs/day: 1 00     Years: 25 00     Total pack years: 25 00     Types: Cigarettes   • Smokeless tobacco: Never   Vaping Use   • Vaping Use: Never used   Substance and Sexual Activity   • Alcohol use: Not Currently   • Drug use: Not Currently     Types: Marijuana, \"Crack\" cocaine     Comment: 30 years ago   • Sexual activity: None   Other Topics Concern   • None   Social History Narrative   • None     Social Determinants of Health     Financial Resource Strain: Not on file   Food Insecurity: Not on file   Transportation Needs: Not on file   Physical Activity: Not on file   Stress: Not on file   Social Connections: Not on file   Intimate Partner Violence: Not on file   Housing Stability: Not on file      Medications and " Allergies:     Current Outpatient Medications   Medication Sig Dispense Refill   • donepezil (ARICEPT) 10 mg tablet Take 1 tablet (10 mg total) by mouth daily at bedtime 90 tablet 1   • fexofenadine (ALLEGRA) 180 MG tablet Take 180 mg by mouth as needed in the morning  • multivitamin (THERAGRAN) TABS Take 1 tablet by mouth in the morning  • rosuvastatin (CRESTOR) 5 mg tablet Take 1 tablet (5 mg total) by mouth daily 90 tablet 1     No current facility-administered medications for this visit  Allergies   Allergen Reactions   • Horse-Derived Products Other (See Comments)     As child        Immunizations:     Immunization History   Administered Date(s) Administered   • COVID-19 PFIZER VACCINE 0 3 ML IM 04/20/2021, 05/11/2021, 12/22/2021   • COVID-19 Pfizer Vac BIVALENT Adolfo-sucrose 12 Yr+ IM (BOOSTER ONLY) 09/30/2022   • H1N1, All Formulations 01/08/2010   • INFLUENZA 10/19/2006, 10/11/2007, 10/21/2008, 09/23/2009, 10/28/2010, 10/04/2011, 09/03/2013, 09/25/2015, 10/20/2016, 09/19/2017, 11/06/2018, 01/11/2021, 11/12/2021, 12/14/2022   • Influenza Quadrivalent Preservative Free 3 years and older IM 09/25/2015, 10/20/2016, 09/19/2017, 11/06/2018, 01/11/2021, 11/12/2021   • Influenza, recombinant, quadrivalent,injectable, preservative free 12/14/2022   • Pneumococcal Polysaccharide PPV23 11/06/2018   • Td (adult), Unspecified 01/01/2003   • Zoster Vaccine Recombinant 01/11/2021, 03/30/2021      Health Maintenance:         Topic Date Due   • Hepatitis C Screening  Never done   • HIV Screening  Never done   • Colorectal Cancer Screening  11/18/2027     There are no preventive care reminders to display for this patient  Medicare Screening Tests and Risk Assessments:     Lorie Alvarenga is here for his Subsequent Wellness visit  Health Risk Assessment:   Patient rates overall health as very good  Patient feels that their physical health rating is same  Patient is satisfied with their life  Eyesight was rated as same  Hearing was rated as same  Patient feels that their emotional and mental health rating is slightly better  Patients states they are never, rarely angry  Patient states they are sometimes unusually tired/fatigued  Pain experienced in the last 7 days has been none  Patient states that he has experienced no weight loss or gain in last 6 months  Depression Screening:   PHQ-2 Score: 0      Fall Risk Screening: In the past year, patient has experienced: no history of falling in past year      Home Safety:  Patient does not have trouble with stairs inside or outside of their home  Patient has working smoke alarms and has working carbon monoxide detector  Home safety hazards include: none  Nutrition:   Current diet is Regular  Medications:   Patient is not currently taking any over-the-counter supplements  Patient is able to manage medications  Activities of Daily Living (ADLs)/Instrumental Activities of Daily Living (IADLs):   Walk and transfer into and out of bed and chair?: Yes  Dress and groom yourself?: Yes    Bathe or shower yourself?: Yes    Feed yourself?  Yes  Do your laundry/housekeeping?: Yes  Manage your money, pay your bills and track your expenses?: No  Make your own meals?: No    Do your own shopping?: Yes    Previous Hospitalizations:   Any hospitalizations or ED visits within the last 12 months?: No      Advance Care Planning:   Living will: No    Durable POA for healthcare: No    Advanced directive: No      Cognitive Screening:   Provider or family/friend/caregiver concerned regarding cognition?: No    PREVENTIVE SCREENINGS      Cardiovascular Screening:    General: Screening Not Indicated and History Lipid Disorder      Diabetes Screening:     General: Screening Current      Colorectal Cancer Screening:     General: Screening Current      Prostate Cancer Screening:    General: Screening Current      Osteoporosis Screening:    General: Risks and Benefits Discussed      Abdominal Aortic "Aneurysm (AAA) Screening:    Risk factors include: tobacco use        General: Risks and Benefits Discussed      Lung Cancer Screening:     General: Screening Current      Hepatitis C Screening:    General: Risks and Benefits Discussed    Screening, Brief Intervention, and Referral to Treatment (SBIRT)    Screening  Typical number of drinks in a day: 0  Typical number of drinks in a week: 0  Interpretation: Low risk drinking behavior  Single Item Drug Screening:  How often have you used an illegal drug (including marijuana) or a prescription medication for non-medical reasons in the past year? never    Single Item Drug Screen Score: 0  Interpretation: Negative screen for possible drug use disorder    Brief Intervention  Alcohol & drug use screenings were reviewed  No concerns regarding substance use disorder identified  Annual Depression Screening  Time spent screening and evaluating the patient for depression during today's encounter was 7 minutes  Other Counseling Topics:   Calcium and vitamin D intake and regular weightbearing exercise  No results found  Physical Exam:     /70 (BP Location: Left arm, Patient Position: Sitting, Cuff Size: Adult)   Pulse 58   Temp 97 5 °F (36 4 °C) (Tympanic)   Resp 16   Ht 5' 8\" (1 727 m)   Wt 62 6 kg (138 lb)   SpO2 97%   BMI 20 98 kg/m²     Physical Exam  Vitals and nursing note reviewed  Constitutional:       General: He is not in acute distress  Appearance: He is well-developed  HENT:      Head: Normocephalic and atraumatic  Eyes:      Conjunctiva/sclera: Conjunctivae normal    Cardiovascular:      Rate and Rhythm: Normal rate and regular rhythm  Heart sounds: No murmur heard  Pulmonary:      Effort: Pulmonary effort is normal  No respiratory distress  Breath sounds: Normal breath sounds  Abdominal:      Palpations: Abdomen is soft  Tenderness: There is no abdominal tenderness     Musculoskeletal:         General: No " swelling  Cervical back: Neck supple  Skin:     General: Skin is warm and dry  Capillary Refill: Capillary refill takes less than 2 seconds  Neurological:      Mental Status: He is alert     Psychiatric:         Mood and Affect: Mood normal           Lorraine Guaman MD

## 2023-08-13 PROBLEM — Z00.00 MEDICARE ANNUAL WELLNESS VISIT, SUBSEQUENT: Status: RESOLVED | Noted: 2023-06-14 | Resolved: 2023-08-13

## 2023-12-07 ENCOUNTER — APPOINTMENT (OUTPATIENT)
Dept: LAB | Age: 58
End: 2023-12-07
Payer: COMMERCIAL

## 2023-12-07 DIAGNOSIS — E78.49 OTHER HYPERLIPIDEMIA: ICD-10-CM

## 2023-12-07 LAB
ALBUMIN SERPL BCP-MCNC: 4.3 G/DL (ref 3.5–5)
ALP SERPL-CCNC: 73 U/L (ref 34–104)
ALT SERPL W P-5'-P-CCNC: 39 U/L (ref 7–52)
ANION GAP SERPL CALCULATED.3IONS-SCNC: 6 MMOL/L
AST SERPL W P-5'-P-CCNC: 32 U/L (ref 13–39)
BASOPHILS # BLD AUTO: 0.06 THOUSANDS/ÂΜL (ref 0–0.1)
BASOPHILS NFR BLD AUTO: 1 % (ref 0–1)
BILIRUB SERPL-MCNC: 0.34 MG/DL (ref 0.2–1)
BUN SERPL-MCNC: 18 MG/DL (ref 5–25)
CALCIUM SERPL-MCNC: 9.2 MG/DL (ref 8.4–10.2)
CHLORIDE SERPL-SCNC: 109 MMOL/L (ref 96–108)
CHOLEST SERPL-MCNC: 146 MG/DL
CO2 SERPL-SCNC: 27 MMOL/L (ref 21–32)
CREAT SERPL-MCNC: 0.92 MG/DL (ref 0.6–1.3)
EOSINOPHIL # BLD AUTO: 0.11 THOUSAND/ÂΜL (ref 0–0.61)
EOSINOPHIL NFR BLD AUTO: 2 % (ref 0–6)
ERYTHROCYTE [DISTWIDTH] IN BLOOD BY AUTOMATED COUNT: 13.5 % (ref 11.6–15.1)
GFR SERPL CREATININE-BSD FRML MDRD: 90 ML/MIN/1.73SQ M
GLUCOSE P FAST SERPL-MCNC: 96 MG/DL (ref 65–99)
HCT VFR BLD AUTO: 46.5 % (ref 36.5–49.3)
HDLC SERPL-MCNC: 58 MG/DL
HGB BLD-MCNC: 14.6 G/DL (ref 12–17)
IMM GRANULOCYTES # BLD AUTO: 0.02 THOUSAND/UL (ref 0–0.2)
IMM GRANULOCYTES NFR BLD AUTO: 0 % (ref 0–2)
LDLC SERPL CALC-MCNC: 77 MG/DL (ref 0–100)
LYMPHOCYTES # BLD AUTO: 1.35 THOUSANDS/ÂΜL (ref 0.6–4.47)
LYMPHOCYTES NFR BLD AUTO: 27 % (ref 14–44)
MCH RBC QN AUTO: 30.5 PG (ref 26.8–34.3)
MCHC RBC AUTO-ENTMCNC: 31.4 G/DL (ref 31.4–37.4)
MCV RBC AUTO: 97 FL (ref 82–98)
MONOCYTES # BLD AUTO: 0.5 THOUSAND/ÂΜL (ref 0.17–1.22)
MONOCYTES NFR BLD AUTO: 10 % (ref 4–12)
NEUTROPHILS # BLD AUTO: 2.92 THOUSANDS/ÂΜL (ref 1.85–7.62)
NEUTS SEG NFR BLD AUTO: 60 % (ref 43–75)
NRBC BLD AUTO-RTO: 0 /100 WBCS
PLATELET # BLD AUTO: 172 THOUSANDS/UL (ref 149–390)
PMV BLD AUTO: 12 FL (ref 8.9–12.7)
POTASSIUM SERPL-SCNC: 4.8 MMOL/L (ref 3.5–5.3)
PROT SERPL-MCNC: 6.6 G/DL (ref 6.4–8.4)
RBC # BLD AUTO: 4.78 MILLION/UL (ref 3.88–5.62)
SODIUM SERPL-SCNC: 142 MMOL/L (ref 135–147)
TRIGL SERPL-MCNC: 54 MG/DL
TSH SERPL DL<=0.05 MIU/L-ACNC: 2.58 UIU/ML (ref 0.45–4.5)
WBC # BLD AUTO: 4.96 THOUSAND/UL (ref 4.31–10.16)

## 2023-12-07 PROCEDURE — 36415 COLL VENOUS BLD VENIPUNCTURE: CPT

## 2023-12-07 PROCEDURE — 80061 LIPID PANEL: CPT

## 2023-12-07 PROCEDURE — 85025 COMPLETE CBC W/AUTO DIFF WBC: CPT

## 2023-12-07 PROCEDURE — 80053 COMPREHEN METABOLIC PANEL: CPT

## 2023-12-07 PROCEDURE — 84443 ASSAY THYROID STIM HORMONE: CPT

## 2023-12-11 ENCOUNTER — OFFICE VISIT (OUTPATIENT)
Dept: FAMILY MEDICINE CLINIC | Facility: CLINIC | Age: 58
End: 2023-12-11
Payer: COMMERCIAL

## 2023-12-11 VITALS
TEMPERATURE: 95.4 F | SYSTOLIC BLOOD PRESSURE: 100 MMHG | OXYGEN SATURATION: 96 % | WEIGHT: 137 LBS | HEART RATE: 69 BPM | HEIGHT: 68 IN | BODY MASS INDEX: 20.76 KG/M2 | DIASTOLIC BLOOD PRESSURE: 58 MMHG | RESPIRATION RATE: 16 BRPM

## 2023-12-11 DIAGNOSIS — R41.3 MEMORY DEFICIT: ICD-10-CM

## 2023-12-11 DIAGNOSIS — Z12.2 ENCOUNTER FOR SCREENING FOR LUNG CANCER: ICD-10-CM

## 2023-12-11 DIAGNOSIS — E78.49 OTHER HYPERLIPIDEMIA: Primary | ICD-10-CM

## 2023-12-11 DIAGNOSIS — J30.89 NON-SEASONAL ALLERGIC RHINITIS DUE TO OTHER ALLERGIC TRIGGER: ICD-10-CM

## 2023-12-11 DIAGNOSIS — Z12.5 PROSTATE CANCER SCREENING: ICD-10-CM

## 2023-12-11 DIAGNOSIS — Z87.891 PERSONAL HISTORY OF NICOTINE DEPENDENCE: ICD-10-CM

## 2023-12-11 PROCEDURE — 99214 OFFICE O/P EST MOD 30 MIN: CPT | Performed by: FAMILY MEDICINE

## 2023-12-11 NOTE — PROGRESS NOTES
Name: Pat Haq      : 1965      MRN: [id number]  Encounter Provider: Yousuf Alejandro MD  Encounter Date: 2023   Encounter department: [facility]     1. Other hyperlipidemia  Assessment & Plan:  Well-controlled. Continue on Crestor 5 mg daily. We will continue to monitor. Orders:  -     CBC and differential; Future  -     Comprehensive metabolic panel; Future  -     Lipid Panel with Direct LDL reflex; Future  -     TSH, 3rd generation with Free T4 reflex; Future    2. Prostate cancer screening  -     PSA, Total Screen; Future    3. Non-seasonal allergic rhinitis due to other allergic trigger  Assessment & Plan:  Stable on Allegra. Continue same. We will continue to monitor. 4. Memory deficit  Assessment & Plan:  Continue on Aricept and continue to follow-up with neurologist.      5. Encounter for screening for lung cancer  -     CT lung screening program; Future; Expected date: 2023    6. Personal history of nicotine dependence   -     CT lung screening program; Future; Expected date: 2023           Subjective     Is here today for follow-up multiple medical problems. He has been taking medication. Denies any side effects. He has blood work done recently came back normal.  He denies any complaint. He continue to follow-up with neurologist for his memory deficits. He is on Aricept with some benefit. Hyperlipidemia  Pertinent negatives include no chest pain or shortness of breath. Review of Systems   Constitutional:  Negative for chills and fever. HENT:  Negative for trouble swallowing. Eyes:  Negative for visual disturbance. Respiratory:  Negative for cough and shortness of breath. Cardiovascular:  Negative for chest pain and palpitations. Gastrointestinal:  Negative for abdominal pain, blood in stool and vomiting. Endocrine: Negative for cold intolerance and heat intolerance.    Genitourinary: Negative for difficulty urinating and dysuria. Musculoskeletal:  Negative for gait problem. Skin:  Negative for rash. Neurological:  Negative for dizziness, syncope and headaches. Hematological:  Negative for adenopathy. Psychiatric/Behavioral:  Negative for behavioral problems. Past Medical History:   Diagnosis Date   • Brain trauma [facility])      Past Surgical History:   Procedure Laterality Date   • KNEE SURGERY       Family History   Problem Relation Age of Onset   • Cancer Mother    • Breast cancer Mother    • Skin cancer Mother    • Cancer Father    • Depression Brother      Social History     Socioeconomic History   • Marital status: Single     Spouse name: None   • Number of children: None   • Years of education: None   • Highest education level: None   Occupational History   • None   Tobacco Use   • Smoking status: Former     Packs/day: 1.00     Years: 25.00     Total pack years: 25.00     Types: Cigarettes   • Smokeless tobacco: Never   Vaping Use   • Vaping Use: Never used   Substance and Sexual Activity   • Alcohol use: Not Currently   • Drug use: Not Currently     Types: Marijuana, "Crack" cocaine     Comment: 30 years ago   • Sexual activity: None   Other Topics Concern   • None   Social History Narrative   • None     Social Determinants of Health     Financial Resource Strain: Not on file   Food Insecurity: Not on file   Transportation Needs: Not on file   Physical Activity: Not on file   Stress: Not on file   Social Connections: Not on file   Intimate Partner Violence: Not on file   Housing Stability: Not on file     Current Outpatient Medications on File Prior to Visit   Medication Sig   • donepezil (ARICEPT) 10 mg tablet Take 1 tablet (10 mg total) by mouth daily at bedtime   • fexofenadine (ALLEGRA) 180 MG tablet Take 180 mg by mouth as needed in the morning. • multivitamin (THERAGRAN) TABS Take 1 tablet by mouth in the morning.    • rosuvastatin (CRESTOR) 5 mg tablet Take 1 tablet (5 mg total) by mouth daily     Allergies   Allergen Reactions   • Horse-Derived Products Other (See Comments)     As child       Immunization History   Administered Date(s) Administered   • COVID-19 PFIZER VACCINE 0.3 ML IM 04/20/2021, 05/11/2021, 12/22/2021   • COVID-19 Pfizer Vac BIVALENT Adolfo-sucrose 12 Yr+ IM 09/30/2022   • H1N1, All Formulations 01/08/2010   • INFLUENZA 10/19/2006, 10/11/2007, 10/21/2008, 09/23/2009, 10/28/2010, 10/04/2011, 09/03/2013, 09/25/2015, 10/20/2016, 09/19/2017, 11/06/2018, 01/11/2021, 11/12/2021, 12/14/2022   • Influenza Quadrivalent Preservative Free 3 years and older IM 09/25/2015, 10/20/2016, 09/19/2017, 11/06/2018, 01/11/2021, 11/12/2021   • Influenza, recombinant, quadrivalent,injectable, preservative free 12/14/2022   • Pneumococcal Polysaccharide PPV23 11/06/2018   • Td (adult), Unspecified 01/01/2003   • Zoster Vaccine Recombinant 01/11/2021, 03/30/2021       Objective     /58 (BP Location: Left arm, Patient Position: Sitting, Cuff Size: Large)   Pulse 69   Temp (!) 95.4 °F (35.2 °C) (Tympanic)   Resp 16   Ht 5' 8" (1.727 m)   Wt 62.1 kg (137 lb)   SpO2 96%   BMI 20.83 kg/m²     Physical Exam  Vitals and nursing note reviewed. Constitutional:       Appearance: He is well-developed. HENT:      Head: Normocephalic and atraumatic. Eyes:      Pupils: Pupils are equal, round, and reactive to light. Cardiovascular:      Rate and Rhythm: Normal rate and regular rhythm. Heart sounds: Normal heart sounds. Pulmonary:      Effort: Pulmonary effort is normal.      Breath sounds: Normal breath sounds. Abdominal:      General: Bowel sounds are normal.      Palpations: Abdomen is soft. Musculoskeletal:      Cervical back: Normal range of motion and neck supple. Lymphadenopathy:      Cervical: No cervical adenopathy. Skin:     General: Skin is warm. Findings: No rash. Neurological:      Mental Status: He is alert. Mental status is at baseline. Isaiah Holman MD

## 2024-01-25 DIAGNOSIS — E78.49 OTHER HYPERLIPIDEMIA: ICD-10-CM

## 2024-01-25 RX ORDER — ROSUVASTATIN CALCIUM 5 MG/1
5 TABLET, COATED ORAL DAILY
Qty: 90 TABLET | Refills: 1 | Status: SHIPPED | OUTPATIENT
Start: 2024-01-25

## 2024-02-12 DIAGNOSIS — E78.49 OTHER HYPERLIPIDEMIA: ICD-10-CM

## 2024-02-12 RX ORDER — ROSUVASTATIN CALCIUM 5 MG/1
5 TABLET, COATED ORAL DAILY
Qty: 90 TABLET | Refills: 1 | Status: SHIPPED | OUTPATIENT
Start: 2024-02-12

## 2024-03-23 ENCOUNTER — OFFICE VISIT (OUTPATIENT)
Dept: URGENT CARE | Age: 59
End: 2024-03-23
Payer: MEDICARE

## 2024-03-23 VITALS
HEIGHT: 70 IN | RESPIRATION RATE: 18 BRPM | WEIGHT: 139.4 LBS | SYSTOLIC BLOOD PRESSURE: 135 MMHG | HEART RATE: 95 BPM | OXYGEN SATURATION: 97 % | TEMPERATURE: 97.6 F | BODY MASS INDEX: 19.96 KG/M2 | DIASTOLIC BLOOD PRESSURE: 76 MMHG

## 2024-03-23 DIAGNOSIS — L03.012 PARONYCHIA OF FINGER OF LEFT HAND: ICD-10-CM

## 2024-03-23 DIAGNOSIS — Z23 ENCOUNTER FOR IMMUNIZATION: ICD-10-CM

## 2024-03-23 DIAGNOSIS — L02.512 ABSCESS OF FINGER, LEFT: Primary | ICD-10-CM

## 2024-03-23 DIAGNOSIS — S61.032A PUNCTURE WOUND OF LEFT THUMB, INITIAL ENCOUNTER: ICD-10-CM

## 2024-03-23 PROCEDURE — G0463 HOSPITAL OUTPT CLINIC VISIT: HCPCS | Performed by: EMERGENCY MEDICINE

## 2024-03-23 PROCEDURE — 90715 TDAP VACCINE 7 YRS/> IM: CPT

## 2024-03-23 PROCEDURE — 99213 OFFICE O/P EST LOW 20 MIN: CPT | Performed by: EMERGENCY MEDICINE

## 2024-03-23 PROCEDURE — 90471 IMMUNIZATION ADMIN: CPT | Performed by: EMERGENCY MEDICINE

## 2024-03-23 RX ORDER — SULFAMETHOXAZOLE AND TRIMETHOPRIM 800; 160 MG/1; MG/1
1 TABLET ORAL 2 TIMES DAILY
Qty: 14 TABLET | Refills: 0 | Status: SHIPPED | OUTPATIENT
Start: 2024-03-23 | End: 2024-03-30

## 2024-03-23 NOTE — PROGRESS NOTES
[facility]        NAME: Mike Perez is a 58 y.o. male  : 1965    MRN: [id number]  DATE: 2024  TIME: 12:32 PM    Assessment and Plan   Paronychia of finger of left hand [L03.012]  1. Paronychia of finger of left hand        2. Abscess of finger, left        Left thumb abscess successfully incised and drained at bedside.  Abscess cavity subsequently packed with iodoform gauze.  Will place patient on Septra upon discharge.  His Tdap was updated.  Advised patient to return to clinic or follow-up with his PCP within 2 days for wound check and packing removal.  Instructions given at bedside for wound care at home.  Advised patient as well as his caregiver and brother to seek care in the ED if he develops worsening symptoms to include severe pain, fevers, rigors or vomiting.  All questions were answered at bedside, patient and patient's brother were amenable to plan voiced and understanding.    Incision and drain    Date/Time: 3/23/2024 12:00 PM    Performed by: Bjorn Olivo DO  Authorized by: Bjorn Olivo DO  Universal Protocol:  Consent: Verbal consent obtained.  Risks and benefits: risks, benefits and alternatives were discussed  Consent given by: patient  Timeout called at: 3/23/2024 12:00 PM.  Patient understanding: patient states understanding of the procedure being performed  Patient consent: the patient's understanding of the procedure matches consent given  Procedure consent: procedure consent matches procedure scheduled  Relevant documents: relevant documents present and verified  Test results: test results available and properly labeled  Site marked: the operative site was marked  Radiology Images displayed and confirmed. If images not available, report reviewed: imaging studies available  Required items: required blood products, implants, devices, and special equipment available  Patient identity confirmed: verbally with patient    Patient location:   Clinic  Location:     Type:  Abscess    Size:  2 cm    Location:  Upper extremity    Upper extremity location:  L thumb  Pre-procedure details:     Skin preparation:  Chloraprep  Anesthesia (see MAR for exact dosages):     Anesthesia method:  Nerve block    Block location:  Left thumb    Block needle gauge:  27 G    Block anesthetic:  Lidocaine 2% w/o epi    Block technique:  Digital    Block injection procedure:  Anatomic landmarks identified, anatomic landmarks palpated, negative aspiration for blood, introduced needle and incremental injection    Block outcome:  Anesthesia achieved  Procedure details:     Complexity:  Simple    Needle aspiration: no      Incision types:  Stab incision    Scalpel blade:  11    Approach:  Open    Incision depth:  Subcutaneous    Wound management:  Probed and deloculated    Drainage:  Purulent    Drainage amount:  Moderate    Wound treatment:  Packing placed    Packing materials:  1/2 in iodoform gauze  Post-procedure details:     Patient tolerance of procedure:  Tolerated well, no immediate complications          Patient Instructions       Follow up with PCP in 3-5 days.  Proceed to  ER if symptoms worsen.    If tests have been performed at [facility], our office will contact you with results if changes need to be made to the care plan discussed with you at the visit.  You can review your full results on [facility].    Chief Complaint     Chief Complaint   Patient presents with    Wound Infection     Left thumb; purple color and swelling x 2 days. Pt has taken ibuprofen and it has helped pain, but not swelling. Pt has also iced the wound and that helped as well. Pt suspects foreign body and denies injury.         History of Present Illness       58-year-old male with history of cognitive decline, hyperlipidemia, allergic rhinitis presents with a chief complaint of left thumb swelling, redness and purulent drainage with onset of 2 days ago.  Patient denies injury to the  "affected thumb however his brother and POA at bedside states that patient is frequently in the garden \"picking at bushes\" and \"sifting to the garbage\".  Patient denies any known injuries to the affected thumb or inciting events.  He is right-handed.  He denies associated fevers, rigors, vomiting.  He states that he does experience some mild numbness around the site of abscess however his sensation is otherwise intact in his left arm and left hand.  He denies any significant pain or bleeding.  Denies inability to move his left thumb.    Wound Check        Review of Systems   Review of Systems   Constitutional:  Negative for activity change, appetite change, chills, diaphoresis, fatigue, fever and unexpected weight change.   HENT:  Negative for congestion, dental problem, drooling, ear discharge, ear pain, facial swelling, hearing loss, mouth sores, nosebleeds, postnasal drip, rhinorrhea, sinus pressure, sinus pain, sneezing, sore throat, tinnitus, trouble swallowing and voice change.    Eyes:  Negative for pain and visual disturbance.   Respiratory:  Negative for apnea, cough, choking, chest tightness, shortness of breath, wheezing and stridor.    Cardiovascular:  Negative for chest pain, palpitations and leg swelling.   Gastrointestinal:  Negative for abdominal pain and vomiting.   Genitourinary:  Negative for dysuria and hematuria.   Musculoskeletal:  Negative for arthralgias, back pain, gait problem, joint swelling, myalgias, neck pain and neck stiffness.   Skin:  Positive for color change and wound. Negative for pallor and rash.   Neurological:  Positive for numbness. Negative for dizziness, tremors, seizures, syncope, facial asymmetry, speech difficulty, weakness, light-headedness and headaches.   All other systems reviewed and are negative.        Current Medications       Current Outpatient Medications:     donepezil (ARICEPT) 10 mg tablet, Take 1 tablet (10 mg total) by mouth daily at bedtime, Disp: 90 " "tablet, Rfl: 1    fexofenadine (ALLEGRA) 180 MG tablet, Take 180 mg by mouth as needed in the morning., Disp: , Rfl:     multivitamin (THERAGRAN) TABS, Take 1 tablet by mouth in the morning., Disp: , Rfl:     rosuvastatin (CRESTOR) 5 mg tablet, Take 1 tablet (5 mg total) by mouth daily, Disp: 90 tablet, Rfl: 1    Current Allergies     Allergies as of 03/23/2024 - Reviewed 03/23/2024   Allergen Reaction Noted    Horse-derived products Other (See Comments) 05/18/2022    Pollen extract Nasal Congestion 03/23/2024            The following portions of the patient's history were reviewed and updated as appropriate: allergies, current medications, past family history, past medical history, past social history, past surgical history and problem list.     Past Medical History:   Diagnosis Date    Brain trauma (HCC)        Past Surgical History:   Procedure Laterality Date    KNEE SURGERY         Family History   Problem Relation Age of Onset    Cancer Mother     Breast cancer Mother     Skin cancer Mother     Cancer Father     Depression Brother          Medications have been verified.        Objective   /76   Pulse 95   Temp 97.6 °F (36.4 °C)   Resp 18   Ht 5' 10\" (1.778 m)   Wt 63.2 kg (139 lb 6.4 oz)   SpO2 97%   BMI 20.00 kg/m²   No LMP for male patient.       Physical Exam     Physical Exam  Vitals and nursing note reviewed.   Constitutional:       General: He is not in acute distress.     Appearance: He is normal weight. He is not ill-appearing or toxic-appearing.   HENT:      Head: Normocephalic and atraumatic.      Right Ear: External ear normal.      Left Ear: External ear normal.      Nose: Nose normal.      Mouth/Throat:      Mouth: Mucous membranes are moist.      Pharynx: No oropharyngeal exudate or posterior oropharyngeal erythema.   Eyes:      General: No scleral icterus.        Right eye: No discharge.         Left eye: No discharge.      Extraocular Movements: Extraocular movements intact.      " Pupils: Pupils are equal, round, and reactive to light.   Cardiovascular:      Rate and Rhythm: Normal rate and regular rhythm.      Pulses: Normal pulses.   Pulmonary:      Effort: Pulmonary effort is normal.   Musculoskeletal:      Cervical back: Normal range of motion and neck supple.   Skin:     Capillary Refill: Capillary refill takes less than 2 seconds.      Coloration: Skin is not jaundiced or pale.      Findings: Abscess, erythema and wound present. No bruising, burn, ecchymosis, laceration, lesion, petechiae or rash.          Neurological:      General: No focal deficit present.      Mental Status: He is alert.      Sensory: Sensory deficit present.      Motor: Motor function is intact. No weakness, tremor, atrophy or abnormal muscle tone.      Comments: There is mild numbness noted around abscess site of distal left thumb however sensation is otherwise grossly intact.  Motor strength in left thumb 5/5.

## 2024-03-25 ENCOUNTER — OFFICE VISIT (OUTPATIENT)
Dept: FAMILY MEDICINE CLINIC | Facility: CLINIC | Age: 59
End: 2024-03-25
Payer: MEDICARE

## 2024-03-25 VITALS
RESPIRATION RATE: 16 BRPM | WEIGHT: 138.6 LBS | DIASTOLIC BLOOD PRESSURE: 70 MMHG | TEMPERATURE: 97.9 F | BODY MASS INDEX: 19.84 KG/M2 | SYSTOLIC BLOOD PRESSURE: 120 MMHG | HEART RATE: 58 BPM | HEIGHT: 70 IN | OXYGEN SATURATION: 98 %

## 2024-03-25 DIAGNOSIS — L02.512 ABSCESS OF LEFT THUMB: Primary | ICD-10-CM

## 2024-03-25 PROCEDURE — 99214 OFFICE O/P EST MOD 30 MIN: CPT | Performed by: NURSE PRACTITIONER

## 2024-03-25 RX ORDER — SULFAMETHOXAZOLE AND TRIMETHOPRIM 800; 160 MG/1; MG/1
1 TABLET ORAL EVERY 12 HOURS SCHEDULED
Qty: 6 TABLET | Refills: 0 | Status: SHIPPED | OUTPATIENT
Start: 2024-03-25 | End: 2024-03-28

## 2024-03-25 NOTE — PROGRESS NOTES
Name: Mike Perez      : 1965      MRN: [id number]  Encounter Provider: LAURA Meléndez  Encounter Date: 3/25/2024   Encounter department: [facility]    Assessment & Plan     1. Abscess of left thumb  Assessment & Plan:  - Packing removed and replaced.   - Will extend Bactrim to 10 days.   - Referred to Wound Care.  - Proceed to ER with any increased drainage, pain, fever, or chills.     Orders:  -     sulfamethoxazole-trimethoprim (BACTRIM DS) 800-160 mg per tablet; Take 1 tablet by mouth every 12 (twelve) hours for 3 days  -     Ambulatory Referral to Wound Care; Future         Subjective     Patient presents to office today accompanied by his brother for follow up after Urgent Care visit on 3/23. He presented with complaints of left thumb redness, swelling, and tenderness. He underwent I&D for abscess. Wound was packed with iodoform guaze. He was started on Bactrim. He  was told to follow up here. Denies any fever, chills, or increased drainage from the wound. Denies any loss of ROM in the thumb. Denies any other concerns or complaints today.        Procedures    Review of Systems   Constitutional:  Negative for fatigue and fever.   HENT:  Negative for trouble swallowing.    Eyes:  Negative for visual disturbance.   Respiratory:  Negative for cough and shortness of breath.    Cardiovascular:  Negative for chest pain and palpitations.   Gastrointestinal:  Negative for abdominal pain and blood in stool.   Endocrine: Negative for cold intolerance and heat intolerance.   Genitourinary:  Negative for difficulty urinating and dysuria.   Musculoskeletal:  Negative for gait problem.   Skin:  Positive for wound. Negative for rash.   Neurological:  Negative for dizziness, syncope and headaches.   Hematological:  Negative for adenopathy.   Psychiatric/Behavioral:  Negative for behavioral problems.        Past Medical History:   Diagnosis Date   • Brain trauma (HCC)      Past  "Surgical History:   Procedure Laterality Date   • KNEE SURGERY       Family History   Problem Relation Age of Onset   • Cancer Mother    • Breast cancer Mother    • Skin cancer Mother    • Cancer Father    • Depression Brother      Social History     Socioeconomic History   • Marital status: Single     Spouse name: None   • Number of children: None   • Years of education: None   • Highest education level: None   Occupational History   • None   Tobacco Use   • Smoking status: Former     Current packs/day: 1.00     Average packs/day: 1 pack/day for 25.0 years (25.0 ttl pk-yrs)     Types: Cigarettes   • Smokeless tobacco: Never   Vaping Use   • Vaping status: Never Used   Substance and Sexual Activity   • Alcohol use: Not Currently   • Drug use: Not Currently     Types: Marijuana, \"Crack\" cocaine     Comment: 30 years ago   • Sexual activity: None   Other Topics Concern   • None   Social History Narrative   • None     Social Determinants of Health     Financial Resource Strain: Not on file   Food Insecurity: Not on file   Transportation Needs: Not on file   Physical Activity: Not on file   Stress: Not on file   Social Connections: Not on file   Intimate Partner Violence: Not on file   Housing Stability: Not on file     Current Outpatient Medications on File Prior to Visit   Medication Sig   • donepezil (ARICEPT) 10 mg tablet Take 1 tablet (10 mg total) by mouth daily at bedtime   • fexofenadine (ALLEGRA) 180 MG tablet Take 180 mg by mouth as needed in the morning.   • multivitamin (THERAGRAN) TABS Take 1 tablet by mouth in the morning.   • rosuvastatin (CRESTOR) 5 mg tablet Take 1 tablet (5 mg total) by mouth daily   • sulfamethoxazole-trimethoprim (BACTRIM DS) 800-160 mg per tablet Take 1 tablet by mouth 2 (two) times a day for 7 days     Allergies   Allergen Reactions   • Horse-Derived Products Other (See Comments)     As child     • Pollen Extract Nasal Congestion     Seasonal     Immunization History   Administered " "Date(s) Administered   • COVID-19 PFIZER VACCINE 0.3 ML IM 04/20/2021, 05/11/2021, 12/22/2021   • COVID-19 Pfizer Vac BIVALENT Adolfo-sucrose 12 Yr+ IM 09/30/2022   • COVID-19 Pfizer mRNA vacc PF adolfo-sucrose 12 yr and older (Comirnaty) 11/15/2023   • H1N1, All Formulations 01/08/2010   • INFLUENZA 10/19/2006, 10/11/2007, 10/21/2008, 09/23/2009, 10/28/2010, 10/04/2011, 09/03/2013, 09/25/2015, 10/20/2016, 09/19/2017, 11/06/2018, 01/11/2021, 11/12/2021, 12/14/2022, 11/15/2023   • Influenza Quadrivalent Preservative Free 3 years and older IM 09/25/2015, 10/20/2016, 09/19/2017, 11/06/2018, 01/11/2021, 11/12/2021   • Influenza, recombinant, quadrivalent,injectable, preservative free 12/14/2022   • Pneumococcal Polysaccharide PPV23 11/06/2018   • Td (adult), Unspecified 01/01/2003   • Tdap 03/23/2024   • Zoster Vaccine Recombinant 01/11/2021, 03/30/2021       Objective     /70 (BP Location: Left arm, Patient Position: Sitting, Cuff Size: Standard)   Pulse 58   Temp 97.9 °F (36.6 °C) (Tympanic)   Resp 16   Ht 5' 10\" (1.778 m)   Wt 62.9 kg (138 lb 9.6 oz)   SpO2 98%   BMI 19.89 kg/m²     Physical Exam  Vitals and nursing note reviewed.   Constitutional:       General: He is not in acute distress.     Appearance: Normal appearance. He is not ill-appearing.   HENT:      Head: Normocephalic and atraumatic.      Right Ear: External ear normal.      Left Ear: External ear normal.   Eyes:      Conjunctiva/sclera: Conjunctivae normal.   Cardiovascular:      Rate and Rhythm: Normal rate and regular rhythm.      Heart sounds: Normal heart sounds.   Pulmonary:      Effort: Pulmonary effort is normal.      Breath sounds: Normal breath sounds.   Musculoskeletal:         General: Normal range of motion.      Cervical back: Normal range of motion.   Skin:     General: Skin is warm and dry.      Findings: Abscess, erythema and wound present.      Comments: 2 cm abscess noted to lateral aspect of left thumb. Packed with guaze. " Area tender. No increased drainage noted upon removal of packing.    Neurological:      Mental Status: He is alert and oriented to person, place, and time.   Psychiatric:         Mood and Affect: Mood normal.         Behavior: Behavior normal.       LAURA Meléndez

## 2024-03-25 NOTE — ASSESSMENT & PLAN NOTE
- Packing removed and replaced.   - Will extend Bactrim to 10 days.   - Referred to Wound Care.  - Proceed to ER with any increased drainage, pain, fever, or chills.

## 2024-03-26 NOTE — PROGRESS NOTES
Patient ID: Mike Perez is a 58 y.o. male Date of Birth 1965       Chief Complaint   Patient presents with    New Patient Visit     Wound to the Left Thumb.  Currently on Bactrim.       Allergies:  Horse-derived products and Pollen extract    Diagnosis:   Diagnosis ICD-10-CM Associated Orders   1. Abscess of left thumb  L02.512 lidocaine (LMX) 4 % cream     Wound miscellaneous orders Abscess Left Finger D1, thumb     Wound cleansing and dressings Abscess Left Finger D1, thumb      2. Status post incision and drainage  Z98.890       3. Open wound of left thumb, subsequent encounter  S61.002D            Assessment  & Plan:    Evaluation of L lateral thumb wound adjacent to nail plate following I&D of abscess. There is granulation tissue formation present. Exposed nail plate beyond the level of the cuticle. No exposed bone or tendon. Does not tunnel. Drainage is small and serous. No evidence of malodor or purulence. Very mild periwound erythema. No edema or lymphangitic streaking of the digit.   No deep cavity to pack further.  to Dermagran gauze. Change daily. Keep thumb covered at all times.   Complete course of Bactrim, as prescribed.   May shower and cleanse the area with gentle soap and water. Avoid harsh cleansers such as hydrogen peroxide. Redress wound immediately post shower. Avoid getting dressing wet and activities such as washing dishes. Should the dressing become wet throughout the day it should be changed immediately.   Obtain 3-4 servings of protein daily for wound healing.   Instructed to monitor for any changes including redness or swelling surrounding the wound, increased drainage or pain as well as fevers or chills.             Subjective:   03/26/24: 59 y/o M with PMHx of hyperlipidemia, traumatic brain injury, presents with his brother for evaluation of his wound on his L thumb. He recently was seen at urgent care on 03/23/24 after noticing swelling, redness and purulent  drainage coming from his L thumb. Initial injury is suspected to be from working outside (doing recycling, gardening, etc). An abscess was noted on examination during his visit which was incised and drained. Iodoform packing was recommended. Pt was initiated on Bactrim and Tdap was updated. Pt had f/u with his PCP two days later on 03/25/24 at which point his course of Bactrim was extended for an additional three days to make a total of 10 day course and he was referred to wound care for further evaluation. On evaluation today he notes that he has been taking his abx as prescribed and is consistent with iodoform packing changes. Has a healthy appetite and obtains protein in his diet. Has quit smoking. Does not have a hx of DM. No fevers, chills.             The following portions of the patient's history were reviewed and updated as appropriate:   Patient Active Problem List   Diagnosis    Personal history of nicotine dependence     Encounter for screening for lung cancer    Memory deficit    Other hyperlipidemia    Allergic rhinitis due to allergen    Abscess of left thumb     Past Medical History:   Diagnosis Date    Brain trauma (HCC)      Past Surgical History:   Procedure Laterality Date    KNEE SURGERY       Family History   Problem Relation Age of Onset    Cancer Mother     Breast cancer Mother     Skin cancer Mother     Cancer Father     Depression Brother      Social History     Socioeconomic History    Marital status: Single     Spouse name: Not on file    Number of children: Not on file    Years of education: Not on file    Highest education level: Not on file   Occupational History    Not on file   Tobacco Use    Smoking status: Former     Current packs/day: 1.00     Average packs/day: 1 pack/day for 25.0 years (25.0 ttl pk-yrs)     Types: Cigarettes    Smokeless tobacco: Never   Vaping Use    Vaping status: Never Used   Substance and Sexual Activity    Alcohol use: Not Currently    Drug use: Not  "Currently     Types: Marijuana, \"Crack\" cocaine     Comment: 30 years ago    Sexual activity: Not on file   Other Topics Concern    Not on file   Social History Narrative    Not on file     Social Determinants of Health     Financial Resource Strain: Not on file   Food Insecurity: Not on file   Transportation Needs: Not on file   Physical Activity: Not on file   Stress: Not on file   Social Connections: Not on file   Intimate Partner Violence: Not on file   Housing Stability: Not on file       Current Outpatient Medications:     donepezil (ARICEPT) 10 mg tablet, Take 1 tablet (10 mg total) by mouth daily at bedtime, Disp: 90 tablet, Rfl: 1    fexofenadine (ALLEGRA) 180 MG tablet, Take 180 mg by mouth as needed in the morning., Disp: , Rfl:     multivitamin (THERAGRAN) TABS, Take 1 tablet by mouth in the morning., Disp: , Rfl:     rosuvastatin (CRESTOR) 5 mg tablet, Take 1 tablet (5 mg total) by mouth daily, Disp: 90 tablet, Rfl: 1    sulfamethoxazole-trimethoprim (BACTRIM DS) 800-160 mg per tablet, Take 1 tablet by mouth 2 (two) times a day for 7 days, Disp: 14 tablet, Rfl: 0    sulfamethoxazole-trimethoprim (BACTRIM DS) 800-160 mg per tablet, Take 1 tablet by mouth every 12 (twelve) hours for 3 days, Disp: 6 tablet, Rfl: 0  No current facility-administered medications for this visit.    Review of Systems   Reason unable to perform ROS: hx of TBI.         Objective:  /78   Pulse 60   Temp 97.5 °F (36.4 °C)   Resp 18   Ht 5' 10\" (1.778 m)   Wt 62.6 kg (138 lb)   BMI 19.80 kg/m²   Pain Score:   4     Physical Exam  Vitals reviewed.   Constitutional:       Appearance: He is normal weight.   Cardiovascular:      Rate and Rhythm: Normal rate.   Pulmonary:      Effort: Pulmonary effort is normal. No respiratory distress.   Musculoskeletal:        Hands:       Comments: L lateral thumb wound adjacent to nail plate following I&D of abscess. There is granulation tissue formation present. Exposed nail plate beyond " the level of the cuticle. No exposed bone or tendon. Does not tunnel. Drainage is small and serous. No evidence of malodor or purulence. Very mild periwound erythema. No significant edema or lymphangitic streaking of the digit. See full wound assessment.      Neurological:      Mental Status: He is alert.         Wound 03/27/24 Abscess Finger D1, thumb Left (Active)   Wound Image   03/27/24 1020   Wound Description Pink;Yellow 03/27/24 1019   Annette-wound Assessment Edema;Erythema 03/27/24 1019   Wound Length (cm) 0.9 cm 03/27/24 1019   Wound Width (cm) 1 cm 03/27/24 1019   Wound Depth (cm) 0.3 cm 03/27/24 1019   Wound Surface Area (cm^2) 0.9 cm^2 03/27/24 1019   Wound Volume (cm^3) 0.27 cm^3 03/27/24 1019   Calculated Wound Volume (cm^3) 0.27 cm^3 03/27/24 1019   Drainage Amount Small 03/27/24 1019   Drainage Description Bloody;Tan 03/27/24 1019   Non-staged Wound Description Full thickness 03/27/24 1019   Wound packed? Yes 03/27/24 1019   Packing- # removed 1 03/27/24 1019                      Wound Instructions:  Orders Placed This Encounter   Procedures    Wound miscellaneous orders Abscess Left Finger D1, thumb     Supplies will be ordered with [facility]--this will be sent to your house.     Standing Status:   Future     Standing Expiration Date:   3/27/2025    Wound cleansing and dressings Abscess Left Finger D1, thumb     Left Thumb wound:  Wash your hands with soap and water.  Remove old dressing, discard into plastic bag and place in trash.    Cleanse the wound with a mild soap such as Dove, rinse well and pat dry prior to applying a clean dressing---Ensure the thumb is the very last thing to wash and rinse in the shower  ---No use of Hydrogen Peroxide or soaking of the wound  Shower yes     Apply Dermagran gauze cut to fit the wound.  Cover with gauze and tape  Change dressing daily and as needed for excessive drainage or dressing to become loose    Wound mechanically debrided with gauze and NSS    Increase  "your Protein intake to 3-4 servings each day to assist with wound healing    Follow up in 1 week at the [facility]     Standing Status:   Future     Standing Expiration Date:   3/27/2025       Shweta Gonzalez PA-C    Portions of the record may have been created with voice recognition software. Occasional wrong word or \"sound alike\" substitutions may have occurred due to the inherent limitations of voice recognition software. Read the chart carefully and recognize, using context, where substitutions have occurred.    "

## 2024-03-27 ENCOUNTER — OFFICE VISIT (OUTPATIENT)
Dept: WOUND CARE | Facility: CLINIC | Age: 59
End: 2024-03-27
Payer: MEDICARE

## 2024-03-27 VITALS
RESPIRATION RATE: 18 BRPM | HEART RATE: 60 BPM | BODY MASS INDEX: 19.76 KG/M2 | HEIGHT: 70 IN | WEIGHT: 138 LBS | SYSTOLIC BLOOD PRESSURE: 128 MMHG | DIASTOLIC BLOOD PRESSURE: 78 MMHG | TEMPERATURE: 97.5 F

## 2024-03-27 DIAGNOSIS — Z98.890 STATUS POST INCISION AND DRAINAGE: ICD-10-CM

## 2024-03-27 DIAGNOSIS — L02.512 ABSCESS OF LEFT THUMB: Primary | ICD-10-CM

## 2024-03-27 DIAGNOSIS — S61.002D OPEN WOUND OF LEFT THUMB, SUBSEQUENT ENCOUNTER: ICD-10-CM

## 2024-03-27 PROCEDURE — 99213 OFFICE O/P EST LOW 20 MIN: CPT | Performed by: STUDENT IN AN ORGANIZED HEALTH CARE EDUCATION/TRAINING PROGRAM

## 2024-03-27 PROCEDURE — 99203 OFFICE O/P NEW LOW 30 MIN: CPT | Performed by: STUDENT IN AN ORGANIZED HEALTH CARE EDUCATION/TRAINING PROGRAM

## 2024-03-27 RX ORDER — LIDOCAINE 40 MG/G
CREAM TOPICAL ONCE
Status: COMPLETED | OUTPATIENT
Start: 2024-03-27 | End: 2024-03-27

## 2024-03-27 RX ADMIN — LIDOCAINE 1 APPLICATION: 40 CREAM TOPICAL at 10:30

## 2024-03-27 NOTE — PATIENT INSTRUCTIONS
Orders Placed This Encounter   Procedures    Wound miscellaneous orders Abscess Left Finger D1, thumb     Supplies will be ordered with [facility]--this will be sent to your house.     Standing Status:   Future     Standing Expiration Date:   3/27/2025    Wound cleansing and dressings Abscess Left Finger D1, thumb     Left Thumb wound:  Wash your hands with soap and water.  Remove old dressing, discard into plastic bag and place in trash.    Cleanse the wound with a mild soap such as Dove, rinse well and pat dry prior to applying a clean dressing---Ensure the thumb is the very last thing to wash and rinse in the shower  ---No use of Hydrogen Peroxide or soaking of the wound  Shower yes     Apply Dermagran gauze cut to fit the wound.  Cover with gauze and tape  Change dressing daily and as needed for excessive drainage or dressing to become loose    Wound mechanically debrided with gauze and NSS    Increase your Protein intake to 3-4 servings each day to assist with wound healing    Follow up in 1 week at the [facility]     Standing Status:   Future     Standing Expiration Date:   3/27/2025

## 2024-04-03 ENCOUNTER — OFFICE VISIT (OUTPATIENT)
Dept: WOUND CARE | Facility: CLINIC | Age: 59
End: 2024-04-03
Payer: MEDICARE

## 2024-04-03 VITALS
SYSTOLIC BLOOD PRESSURE: 120 MMHG | DIASTOLIC BLOOD PRESSURE: 66 MMHG | HEART RATE: 64 BPM | RESPIRATION RATE: 16 BRPM | TEMPERATURE: 97.6 F

## 2024-04-03 DIAGNOSIS — Z98.890 STATUS POST INCISION AND DRAINAGE: ICD-10-CM

## 2024-04-03 DIAGNOSIS — S61.002D OPEN WOUND OF LEFT THUMB, SUBSEQUENT ENCOUNTER: ICD-10-CM

## 2024-04-03 DIAGNOSIS — L02.512 ABSCESS OF LEFT THUMB: Primary | ICD-10-CM

## 2024-04-03 PROCEDURE — 99213 OFFICE O/P EST LOW 20 MIN: CPT | Performed by: STUDENT IN AN ORGANIZED HEALTH CARE EDUCATION/TRAINING PROGRAM

## 2024-04-03 PROCEDURE — 99212 OFFICE O/P EST SF 10 MIN: CPT | Performed by: STUDENT IN AN ORGANIZED HEALTH CARE EDUCATION/TRAINING PROGRAM

## 2024-04-03 NOTE — PATIENT INSTRUCTIONS
Orders Placed This Encounter   Procedures    Wound cleansing and dressings Abscess Left Finger D1, thumb     Left Thumb wound:  Wash wound with soap and water--recommend Dove, rinse well and pat dry.  Do not soak the thumb in water    Paint the Thumb area with Betadine and allow to dry over 5 minutes then cover with a bandaid  Change the dressing each day for 1 week    Treatments completed as above today at the [facility]    Monitor for any signs or symptoms of infection such as increased redness, swelling, pain fever present, call the [facility] or may go to the ER for an evaluation    Follow up in 1 week     Standing Status:   Future     Standing Expiration Date:   4/10/2025

## 2024-04-03 NOTE — PROGRESS NOTES
Patient ID: Mike Perez is a 58 y.o. male Date of Birth 1965       Chief Complaint   Patient presents with    Follow Up Wound Care Visit     Left Thumb wound       Allergies:  Horse-derived products and Pollen extract    Diagnosis:   Diagnosis ICD-10-CM Associated Orders   1. Abscess of left thumb  L02.512 Wound cleansing and dressings Abscess Left Finger D1, thumb      2. Status post incision and drainage  Z98.890       3. Open wound of left thumb, subsequent encounter  S61.002D            Assessment  & Plan:    F/u open wound of L lateral thumb adjacent to the nail plate following I&D of abscess. Is much improved from prior visit. There is new epithelization present over the prior wound bed. Small area next to cuticle with possible remaining open area with small amount of dried blood. Not tender to palpation. Reduction in ROM at PIP joint.   Burtrum site with betadine and allow to dry. Keep covered with bandage. Keep covered for one additional week as precaution.   Going forward should be wearing gloves when working with brush/recycling outside to avoid further injury.   May shower and cleanse the area with gentle soap and water. Avoid harsh cleansers such as hydrogen peroxide. Redress wound immediately post shower. Avoid getting dressing wet and activities such as washing dishes. Should the dressing become wet throughout the day it should be changed immediately.   Encouraged patient to move thumb joint this week to avoid it stiffening.   Instructed to monitor for any changes including redness or swelling surrounding the wound, increased drainage or pain as well as fevers or chills.    F/u in one week. Instructed to call if any questions or concerns arise in meantime.            Subjective:   03/26/24: 59 y/o M with PMHx of hyperlipidemia, traumatic brain injury, presents with his brother for evaluation of his wound on his L thumb. He recently was seen at urgent care on 03/23/24 after noticing swelling, redness  and purulent drainage coming from his L thumb. Initial injury is suspected to be from working outside (doing recycling, gardening, etc). An abscess was noted on examination during his visit which was incised and drained. Iodoform packing was recommended. Pt was initiated on Bactrim and Tdap was updated. Pt had f/u with his PCP two days later on 03/25/24 at which point his course of Bactrim was extended for an additional three days to make a total of 10 day course and he was referred to wound care for further evaluation. On evaluation today he notes that he has been taking his abx as prescribed and is consistent with iodoform packing changes. Has a healthy appetite and obtains protein in his diet. Has quit smoking. Does not have a hx of DM. No fevers, chills.     04/03/24: Pt presents for f/u of L thumb wound following abscess I&D. Dermagran is currently being used. Pt is doing well. Does not report any significant pain or drainage from the site. Has some limited ROM because he has been trying to keep his thumb straighter.             The following portions of the patient's history were reviewed and updated as appropriate:   Patient Active Problem List   Diagnosis    Personal history of nicotine dependence     Encounter for screening for lung cancer    Memory deficit    Other hyperlipidemia    Allergic rhinitis due to allergen    Abscess of left thumb     Past Medical History:   Diagnosis Date    Brain trauma (HCC)      Past Surgical History:   Procedure Laterality Date    KNEE SURGERY       Family History   Problem Relation Age of Onset    Cancer Mother     Breast cancer Mother     Skin cancer Mother     Cancer Father     Depression Brother      Social History     Socioeconomic History    Marital status: Single     Spouse name: None    Number of children: None    Years of education: None    Highest education level: None   Occupational History    None   Tobacco Use    Smoking status: Former     Current packs/day:  "1.00     Average packs/day: 1 pack/day for 25.0 years (25.0 ttl pk-yrs)     Types: Cigarettes    Smokeless tobacco: Never   Vaping Use    Vaping status: Never Used   Substance and Sexual Activity    Alcohol use: Not Currently    Drug use: Not Currently     Types: Marijuana, \"Crack\" cocaine     Comment: 30 years ago    Sexual activity: None   Other Topics Concern    None   Social History Narrative    None     Social Determinants of Health     Financial Resource Strain: Not on file   Food Insecurity: Not on file   Transportation Needs: Not on file   Physical Activity: Not on file   Stress: Not on file   Social Connections: Not on file   Intimate Partner Violence: Not on file   Housing Stability: Not on file       Current Outpatient Medications:     donepezil (ARICEPT) 10 mg tablet, Take 1 tablet (10 mg total) by mouth daily at bedtime, Disp: 90 tablet, Rfl: 1    fexofenadine (ALLEGRA) 180 MG tablet, Take 180 mg by mouth as needed in the morning., Disp: , Rfl:     multivitamin (THERAGRAN) TABS, Take 1 tablet by mouth in the morning., Disp: , Rfl:     rosuvastatin (CRESTOR) 5 mg tablet, Take 1 tablet (5 mg total) by mouth daily, Disp: 90 tablet, Rfl: 1    Review of Systems   Reason unable to perform ROS: hx of TBI.         Objective:  /66   Pulse 64   Temp 97.6 °F (36.4 °C)   Resp 16   Pain Score: 0-No pain     Physical Exam  Vitals reviewed.   Constitutional:       Appearance: He is normal weight.   Cardiovascular:      Rate and Rhythm: Normal rate.   Pulmonary:      Effort: Pulmonary effort is normal. No respiratory distress.   Musculoskeletal:        Hands:       Comments: L lateral thumb wound adjacent to nail plate following I&D of abscess. Is much improved from prior visit. There is new epithelization present over the prior wound bed. Small area next to cuticle with possible remaining open area with small amount of dried blood. Not tender to palpation. Mild reduction in ROM at PIP joint.       " "  Neurological:      Mental Status: He is alert.         Wound 03/27/24 Abscess Finger D1, thumb Left (Active)   Wound Image Images linked 04/03/24 1313   Wound Description Epithelialization 04/03/24 1314   Annette-wound Assessment Edema;Pink 04/03/24 1314   Wound Length (cm) 0.1 cm 04/03/24 1314   Wound Width (cm) 0.1 cm 04/03/24 1314   Wound Depth (cm) 0.1 cm 04/03/24 1314   Wound Surface Area (cm^2) 0.01 cm^2 04/03/24 1314   Wound Volume (cm^3) 0.001 cm^3 04/03/24 1314   Calculated Wound Volume (cm^3) 0 cm^3 04/03/24 1314   Change in Wound Size % 100 04/03/24 1314   Drainage Amount Scant 04/03/24 1314   Drainage Description Serous 04/03/24 1314   Non-staged Wound Description Partial thickness 04/03/24 1314   Dressing Status Intact 04/03/24 1314                 Wound Instructions:  Orders Placed This Encounter   Procedures    Wound cleansing and dressings Abscess Left Finger D1, thumb     Left Thumb wound:  Wash wound with soap and water--recommend Dove, rinse well and pat dry.  Do not soak the thumb in water    Paint the Thumb area with Betadine and allow to dry over 5 minutes then cover with a bandaid  Change the dressing each day for 1 week    Treatments completed as above today at the [facility]    Monitor for any signs or symptoms of infection such as increased redness, swelling, pain fever present, call the [facility] or may go to the ER for an evaluation    Follow up in 1 week     Standing Status:   Future     Standing Expiration Date:   4/10/2025       Shweta Gonzalez PA-C      Portions of the record may have been created with voice recognition software. Occasional wrong word or \"sound alike\" substitutions may have occurred due to the inherent limitations of voice recognition software. Read the chart carefully and recognize, using context, where substitutions have occurred.    "

## 2024-04-10 ENCOUNTER — OFFICE VISIT (OUTPATIENT)
Dept: WOUND CARE | Facility: CLINIC | Age: 59
End: 2024-04-10
Payer: MEDICARE

## 2024-04-10 VITALS
DIASTOLIC BLOOD PRESSURE: 76 MMHG | HEART RATE: 64 BPM | TEMPERATURE: 97.1 F | SYSTOLIC BLOOD PRESSURE: 110 MMHG | RESPIRATION RATE: 18 BRPM

## 2024-04-10 DIAGNOSIS — Z98.890 STATUS POST INCISION AND DRAINAGE: ICD-10-CM

## 2024-04-10 DIAGNOSIS — L02.512 ABSCESS OF LEFT THUMB: Primary | ICD-10-CM

## 2024-04-10 PROCEDURE — 99212 OFFICE O/P EST SF 10 MIN: CPT | Performed by: STUDENT IN AN ORGANIZED HEALTH CARE EDUCATION/TRAINING PROGRAM

## 2024-04-10 NOTE — PATIENT INSTRUCTIONS
Orders Placed This Encounter   Procedures    Wound cleansing and dressings Abscess Left Finger D1, thumb     Left Thumb wound:    Your wound is healed.  Wash wound with soap and water--recommend Dove, rinse well and pat dry.  Do not soak the thumb in water    Wear gloves outside when working in the yard.  Monitor for any signs or symptoms of infection such as increased redness, swelling, pain fever present, call the [facility] or may go to the ER for an evaluation     Standing Status:   Future     Standing Expiration Date:   4/17/2024

## 2024-04-10 NOTE — PROGRESS NOTES
Patient ID: Mike Perez is a 58 y.o. male Date of Birth 1965       Chief Complaint   Patient presents with    Follow Up Wound Care Visit     Left thumb       Allergies:  Horse-derived products and Pollen extract    Diagnosis:   Diagnosis ICD-10-CM Associated Orders   1. Abscess of left thumb  L02.512 Wound cleansing and dressings Abscess Left Finger D1, thumb      2. Status post incision and drainage  Z98.890            Assessment  & Plan:    F/u open wound of the L lateral thumb adjacent to nail plate following abscess I&D. Is now healed. There is no drainage from the site. There is hyperpigmented scar tissue present. No edema or tenderness of the healed site. Improved ROM.   No further dressing required, wound is now healed.   Going forward should be wearing gloves when working with brush/recycling outside to avoid further injury.   D/c from wound care center. Monitor thumb for any further purulent drainage, redness, tenderness, swelling and seek care immediately should this occur. Call in future with any questions/concerns/future wounds.          Subjective:   03/26/24: 59 y/o M with PMHx of hyperlipidemia, traumatic brain injury, presents with his brother for evaluation of his wound on his L thumb. He recently was seen at urgent care on 03/23/24 after noticing swelling, redness and purulent drainage coming from his L thumb. Initial injury is suspected to be from working outside (doing recycling, gardening, etc). An abscess was noted on examination during his visit which was incised and drained. Iodoform packing was recommended. Pt was initiated on Bactrim and Tdap was updated. Pt had f/u with his PCP two days later on 03/25/24 at which point his course of Bactrim was extended for an additional three days to make a total of 10 day course and he was referred to wound care for further evaluation. On evaluation today he notes that he has been taking his abx as prescribed and is consistent with iodoform  "packing changes. Has a healthy appetite and obtains protein in his diet. Has quit smoking. Does not have a hx of DM. No fevers, chills.     04/03/24: Pt presents for f/u of L thumb wound following abscess I&D. Dermagran is currently being used. Pt is doing well. Does not report any significant pain or drainage from the site. Has some limited ROM because he has been trying to keep his thumb straighter.       04/10/24: Pt presents for f/u of L thumb wound s/p abscess I&D. He has completed his course of antibiotics. Has been painting his thumb with betadine daily. Has not noticed any drainage from the site. Does not have any pain or tenderness at the site. Has been moving his thumb more this week.           The following portions of the patient's history were reviewed and updated as appropriate:   Patient Active Problem List   Diagnosis    Personal history of nicotine dependence     Encounter for screening for lung cancer    Memory deficit    Other hyperlipidemia    Allergic rhinitis due to allergen    Abscess of left thumb     Past Medical History:   Diagnosis Date    Brain trauma (HCC)      Past Surgical History:   Procedure Laterality Date    KNEE SURGERY       Family History   Problem Relation Age of Onset    Cancer Mother     Breast cancer Mother     Skin cancer Mother     Cancer Father     Depression Brother      Social History     Socioeconomic History    Marital status: Single     Spouse name: None    Number of children: None    Years of education: None    Highest education level: None   Occupational History    None   Tobacco Use    Smoking status: Former     Current packs/day: 1.00     Average packs/day: 1 pack/day for 25.0 years (25.0 ttl pk-yrs)     Types: Cigarettes    Smokeless tobacco: Never   Vaping Use    Vaping status: Never Used   Substance and Sexual Activity    Alcohol use: Not Currently    Drug use: Not Currently     Types: Marijuana, \"Crack\" cocaine     Comment: 30 years ago    Sexual activity: " None   Other Topics Concern    None   Social History Narrative    None     Social Determinants of Health     Financial Resource Strain: Not on file   Food Insecurity: Not on file   Transportation Needs: Not on file   Physical Activity: Not on file   Stress: Not on file   Social Connections: Not on file   Intimate Partner Violence: Not on file   Housing Stability: Not on file       Current Outpatient Medications:     donepezil (ARICEPT) 10 mg tablet, Take 1 tablet (10 mg total) by mouth daily at bedtime, Disp: 90 tablet, Rfl: 1    fexofenadine (ALLEGRA) 180 MG tablet, Take 180 mg by mouth as needed in the morning., Disp: , Rfl:     multivitamin (THERAGRAN) TABS, Take 1 tablet by mouth in the morning., Disp: , Rfl:     rosuvastatin (CRESTOR) 5 mg tablet, Take 1 tablet (5 mg total) by mouth daily, Disp: 90 tablet, Rfl: 1    Review of Systems   Reason unable to perform ROS: hx of TBI.         Objective:  /76   Pulse 64   Temp (!) 97.1 °F (36.2 °C)   Resp 18         Physical Exam  Vitals reviewed.   Constitutional:       Appearance: He is normal weight.   Cardiovascular:      Rate and Rhythm: Normal rate.   Pulmonary:      Effort: Pulmonary effort is normal. No respiratory distress.   Musculoskeletal:      Comments: L lateral thumb wound adjacent to nail plate is now healed. There is no drainage from the site. There is hyperpigmented scar tissue present. No edema or tenderness of the healed site. Improved ROM.      Neurological:      Mental Status: He is alert.         Wound 03/27/24 Abscess Finger D1, thumb Left (Active)   Wound Image   04/10/24 1125   Wound Description Epithelialization 04/10/24 1128   Annette-wound Assessment Edema;Pink 04/10/24 1128   Wound Length (cm) 0 cm 04/10/24 1128   Wound Width (cm) 0 cm 04/10/24 1128   Wound Depth (cm) 0 cm 04/10/24 1128   Wound Surface Area (cm^2) 0 cm^2 04/10/24 1128   Wound Volume (cm^3) 0 cm^3 04/10/24 1128   Calculated Wound Volume (cm^3) 0 cm^3 04/10/24 1128  "  Change in Wound Size % 100 04/10/24 1128   Drainage Amount None 04/10/24 1128   Drainage Description Serous 04/03/24 1314   Non-staged Wound Description Partial thickness 04/03/24 1314   Wound packed? Yes 03/27/24 1019   Packing- # removed 1 03/27/24 1019   Dressing Status Intact 04/03/24 1314                            Wound Instructions:  Orders Placed This Encounter   Procedures    Wound cleansing and dressings Abscess Left Finger D1, thumb     Left Thumb wound:    Your wound is healed.  Wash wound with soap and water--recommend Dove, rinse well and pat dry.  Do not soak the thumb in water    Wear gloves outside when working in the yard.  Monitor for any signs or symptoms of infection such as increased redness, swelling, pain fever present, call the [facility] or may go to the ER for an evaluation     Standing Status:   Future     Standing Expiration Date:   4/17/2024       Shweta Gonzalez PA-C      Portions of the record may have been created with voice recognition software. Occasional wrong word or \"sound alike\" substitutions may have occurred due to the inherent limitations of voice recognition software. Read the chart carefully and recognize, using context, where substitutions have occurred.    "

## 2024-06-17 ENCOUNTER — OFFICE VISIT (OUTPATIENT)
Dept: FAMILY MEDICINE CLINIC | Facility: CLINIC | Age: 59
End: 2024-06-17
Payer: MEDICARE

## 2024-06-17 VITALS
DIASTOLIC BLOOD PRESSURE: 82 MMHG | WEIGHT: 140 LBS | RESPIRATION RATE: 16 BRPM | HEART RATE: 63 BPM | HEIGHT: 70 IN | BODY MASS INDEX: 20.04 KG/M2 | OXYGEN SATURATION: 98 % | SYSTOLIC BLOOD PRESSURE: 128 MMHG | TEMPERATURE: 97.8 F

## 2024-06-17 DIAGNOSIS — E78.49 OTHER HYPERLIPIDEMIA: Primary | ICD-10-CM

## 2024-06-17 DIAGNOSIS — Z12.5 PROSTATE CANCER SCREENING: ICD-10-CM

## 2024-06-17 DIAGNOSIS — R41.3 MEMORY LOSS: ICD-10-CM

## 2024-06-17 DIAGNOSIS — J30.89 NON-SEASONAL ALLERGIC RHINITIS DUE TO OTHER ALLERGIC TRIGGER: ICD-10-CM

## 2024-06-17 DIAGNOSIS — Z00.00 MEDICARE ANNUAL WELLNESS VISIT, SUBSEQUENT: ICD-10-CM

## 2024-06-17 PROCEDURE — G0444 DEPRESSION SCREEN ANNUAL: HCPCS | Performed by: FAMILY MEDICINE

## 2024-06-17 PROCEDURE — G0439 PPPS, SUBSEQ VISIT: HCPCS | Performed by: FAMILY MEDICINE

## 2024-06-17 PROCEDURE — 99214 OFFICE O/P EST MOD 30 MIN: CPT | Performed by: FAMILY MEDICINE

## 2024-06-17 NOTE — PROGRESS NOTES
Ambulatory Visit  Name: Mike Perez      : 1965      MRN: [id number]  Encounter Provider: Annmarie Reynolds MD  Encounter Date: 2024   Encounter department: [facility]    Assessment & Plan   1. Other hyperlipidemia  Assessment & Plan:  Well-controlled.  Continue on Crestor 5 mg daily.  We will continue to monitor.  Orders:  -     CBC and differential; Future  -     Comprehensive metabolic panel; Future  -     Lipid Panel with Direct LDL reflex; Future  -     TSH, 3rd generation with Free T4 reflex; Future  2. Prostate cancer screening  -     PSA, Total Screen; Future  3. Medicare annual wellness visit, subsequent  Assessment & Plan:  It was discussed about immunizations, diet, exercise and safety measures.  4. Memory loss  Assessment & Plan:  Continue on Aricept and continue to follow-up with neurologist.  5. Non-seasonal allergic rhinitis due to other allergic trigger  Assessment & Plan:  Stable on Allegra.  Continue same.  We will continue to monitor.       Preventive health issues were discussed with patient, and age appropriate screening tests were ordered as noted in patient's After Visit Summary. Personalized health advice and appropriate referrals for health education or preventive services given if needed, as noted in patient's After Visit Summary.    History of Present Illness     Is here today for follow-up multiple medical problems.  He has been taking his medications.  He denies any side effects to his medications.  He is due for blood work.  He had recent procedure on his right hand for Dupuytren's.       Patient Care Team:  Annmarie Reynolds MD as PCP - General (Family Medicine)    Review of Systems   Constitutional:  Negative for chills and fever.   HENT:  Negative for trouble swallowing.    Eyes:  Negative for visual disturbance.   Respiratory:  Negative for cough and shortness of breath.    Cardiovascular:  Negative for chest pain and palpitations.    Gastrointestinal:  Negative for abdominal pain, blood in stool and vomiting.   Endocrine: Negative for cold intolerance and heat intolerance.   Genitourinary:  Negative for difficulty urinating and dysuria.   Musculoskeletal:  Negative for gait problem.   Skin:  Negative for rash.   Neurological:  Negative for dizziness, syncope and headaches.   Hematological:  Negative for adenopathy.   Psychiatric/Behavioral:  Negative for behavioral problems.      Medical History Reviewed by provider this encounter:  Tobacco  Allergies  Meds  Problems  Med Hx  Surg Hx  Fam Hx       Annual Wellness Visit Questionnaire   Mike is here for his Subsequent Wellness visit.     Health Risk Assessment:   Patient rates overall health as good. Patient feels that their physical health rating is same. Patient is very satisfied with their life. Eyesight was rated as same. Hearing was rated as same. Patient feels that their emotional and mental health rating is same. Patients states they are never, rarely angry. Patient states they are often unusually tired/fatigued. Pain experienced in the last 7 days has been some. Patient's pain rating has been 3/10. Patient states that he has experienced no weight loss or gain in last 6 months.     Depression Screening:   PHQ-2 Score: 0      Fall Risk Screening:   In the past year, patient has experienced: no history of falling in past year      Home Safety:  Patient does not have trouble with stairs inside or outside of their home. Patient has working smoke alarms and has working carbon monoxide detector. Home safety hazards include: none.     Nutrition:   Current diet is Regular.     Medications:   Patient is currently taking over-the-counter supplements. OTC medications include: see medication list. Patient is able to manage medications.     Activities of Daily Living (ADLs)/Instrumental Activities of Daily Living (IADLs):   Walk and transfer into and out of bed and chair?: Yes  Dress and groom  yourself?: Yes    Bathe or shower yourself?: Yes    Feed yourself? Yes  Do your laundry/housekeeping?: Yes  Manage your money, pay your bills and track your expenses?: No  Make your own meals?: No    Do your own shopping?: No    Previous Hospitalizations:   Any hospitalizations or ED visits within the last 12 months?: Yes    How many hospitalizations have you had in the last year?: 1-2    Advance Care Planning:   Living will: Yes    Durable POA for healthcare: Yes    Advanced directive: Yes      Cognitive Screening:   Provider or family/friend/caregiver concerned regarding cognition?: No    PREVENTIVE SCREENINGS      Cardiovascular Screening:    General: Screening Not Indicated and History Lipid Disorder      Diabetes Screening:     General: Screening Current      Colorectal Cancer Screening:     General: Screening Current      Prostate Cancer Screening:    General: Risks and Benefits Discussed    Due for: PSA      Osteoporosis Screening:    General: Risks and Benefits Discussed      Abdominal Aortic Aneurysm (AAA) Screening:    Risk factors include: tobacco use        General: Risks and Benefits Discussed      Lung Cancer Screening:     General: Risks and Benefits Discussed      Hepatitis C Screening:    General: Risks and Benefits Discussed    Screening, Brief Intervention, and Referral to Treatment (SBIRT)    Screening  Typical number of drinks in a day: 0  Typical number of drinks in a week: 0  Interpretation: Low risk drinking behavior.    Single Item Drug Screening:  How often have you used an illegal drug (including marijuana) or a prescription medication for non-medical reasons in the past year? never    Single Item Drug Screen Score: 0  Interpretation: Negative screen for possible drug use disorder    Brief Intervention  Alcohol & drug use screenings were reviewed. No concerns regarding substance use disorder identified.     Annual Depression Screening  Time spent screening and evaluating the patient for  "depression during today's encounter was 7 minutes.    Other Counseling Topics:   Calcium and vitamin D intake and regular weightbearing exercise.        No results found.    Objective     /82 (BP Location: Left arm, Patient Position: Sitting, Cuff Size: Adult)   Pulse 63   Temp 97.8 °F (36.6 °C) (Tympanic)   Resp 16   Ht 5' 10\" (1.778 m)   Wt 63.5 kg (140 lb)   SpO2 98%   BMI 20.09 kg/m²     Physical Exam  Vitals and nursing note reviewed.   Constitutional:       Appearance: He is well-developed.   HENT:      Head: Normocephalic and atraumatic.   Eyes:      Pupils: Pupils are equal, round, and reactive to light.   Cardiovascular:      Rate and Rhythm: Normal rate and regular rhythm.      Heart sounds: Normal heart sounds.   Pulmonary:      Effort: Pulmonary effort is normal.      Breath sounds: Normal breath sounds.   Abdominal:      General: Bowel sounds are normal.      Palpations: Abdomen is soft.   Musculoskeletal:      Cervical back: Normal range of motion and neck supple.   Lymphadenopathy:      Cervical: No cervical adenopathy.   Skin:     General: Skin is warm.      Findings: No rash.   Neurological:      Mental Status: He is alert and oriented to person, place, and time.       Administrative Statements           "

## 2024-07-17 PROBLEM — Z00.00 MEDICARE ANNUAL WELLNESS VISIT, SUBSEQUENT: Status: RESOLVED | Noted: 2023-06-14 | Resolved: 2024-07-17

## 2024-07-17 PROBLEM — Z12.5 PROSTATE CANCER SCREENING: Status: RESOLVED | Noted: 2024-06-17 | Resolved: 2024-07-17

## 2024-08-08 DIAGNOSIS — E78.49 OTHER HYPERLIPIDEMIA: ICD-10-CM

## 2024-08-08 RX ORDER — ROSUVASTATIN CALCIUM 5 MG/1
5 TABLET, COATED ORAL DAILY
Qty: 90 TABLET | Refills: 1 | Status: SHIPPED | OUTPATIENT
Start: 2024-08-08

## 2024-08-27 LAB
ALBUMIN SERPL-MCNC: 4.3 G/DL (ref 3.5–5.7)
ALP SERPL-CCNC: 66 U/L (ref 35–120)
ALT SERPL-CCNC: 28 U/L
ANION GAP SERPL CALCULATED.3IONS-SCNC: 8 MMOL/L (ref 3–11)
AST SERPL-CCNC: 22 U/L
BASOPHILS # BLD AUTO: 0.1 THOU/CMM (ref 0–0.1)
BASOPHILS NFR BLD AUTO: 1 %
BILIRUB SERPL-MCNC: 0.6 MG/DL (ref 0.2–1)
BUN SERPL-MCNC: 18 MG/DL (ref 7–28)
CALCIUM SERPL-MCNC: 9.4 MG/DL (ref 8.5–10.1)
CHLORIDE SERPL-SCNC: 105 MMOL/L (ref 100–109)
CHOLEST SERPL-MCNC: 140 MG/DL
CHOLEST/HDLC SERPL: 2.4 {RATIO}
CO2 SERPL-SCNC: 27 MMOL/L (ref 21–31)
CREAT SERPL-MCNC: 0.93 MG/DL (ref 0.53–1.3)
CYTOLOGY CMNT CVX/VAG CYTO-IMP: NORMAL
DIFFERENTIAL METHOD BLD: NORMAL
EOSINOPHIL # BLD AUTO: 0.1 THOU/CMM (ref 0–0.5)
EOSINOPHIL NFR BLD AUTO: 2 %
ERYTHROCYTE [DISTWIDTH] IN BLOOD BY AUTOMATED COUNT: 13.2 % (ref 12–16)
GFR/BSA.PRED SERPLBLD CYS-BASED-ARV: 94 ML/MIN/{1.73_M2}
GLUCOSE SERPL-MCNC: 94 MG/DL (ref 65–99)
HCT VFR BLD AUTO: 42 % (ref 37–48)
HDLC SERPL-MCNC: 58 MG/DL (ref 23–92)
HGB BLD-MCNC: 14 G/DL (ref 12.5–17)
LDLC SERPL CALC-MCNC: 68 MG/DL
LYMPHOCYTES # BLD AUTO: 1.3 THOU/CMM (ref 1–3)
LYMPHOCYTES NFR BLD AUTO: 25 %
MCH RBC QN AUTO: 31 PG (ref 27–36)
MCHC RBC AUTO-ENTMCNC: 33.5 G/DL (ref 32–37)
MCV RBC AUTO: 93 FL (ref 80–100)
MONOCYTES # BLD AUTO: 0.6 THOU/CMM (ref 0.3–1)
MONOCYTES NFR BLD AUTO: 11 %
NEUTROPHILS # BLD AUTO: 3.3 THOU/CMM (ref 1.8–7.8)
NEUTROPHILS NFR BLD AUTO: 61 %
NONHDLC SERPL-MCNC: 82 MG/DL
PLATELET # BLD AUTO: 169 THOU/CMM (ref 140–350)
PMV BLD REES-ECKER: 10.1 FL (ref 7.5–11.3)
POTASSIUM SERPL-SCNC: 4.2 MMOL/L (ref 3.5–5.2)
PROT SERPL-MCNC: 6.6 G/DL (ref 6.3–8.3)
PSA SERPL-MCNC: 2.13 NG/ML
RBC # BLD AUTO: 4.54 MILL/CMM (ref 4–5.4)
SODIUM SERPL-SCNC: 140 MMOL/L (ref 135–145)
TRIGL SERPL-MCNC: 71 MG/DL
TSH SERPL-ACNC: 2.46 UIU/ML (ref 0.45–5.33)
WBC # BLD AUTO: 5.3 THOU/CMM (ref 4–10.5)

## 2024-12-17 ENCOUNTER — OFFICE VISIT (OUTPATIENT)
Dept: FAMILY MEDICINE CLINIC | Facility: CLINIC | Age: 59
End: 2024-12-17
Payer: MEDICARE

## 2024-12-17 VITALS
RESPIRATION RATE: 16 BRPM | OXYGEN SATURATION: 98 % | SYSTOLIC BLOOD PRESSURE: 122 MMHG | HEART RATE: 75 BPM | HEIGHT: 70 IN | BODY MASS INDEX: 20.56 KG/M2 | WEIGHT: 143.6 LBS | TEMPERATURE: 95.8 F | DIASTOLIC BLOOD PRESSURE: 80 MMHG

## 2024-12-17 DIAGNOSIS — R41.3 MEMORY LOSS: ICD-10-CM

## 2024-12-17 DIAGNOSIS — R73.9 HYPERGLYCEMIA: ICD-10-CM

## 2024-12-17 DIAGNOSIS — J30.89 NON-SEASONAL ALLERGIC RHINITIS DUE TO OTHER ALLERGIC TRIGGER: ICD-10-CM

## 2024-12-17 DIAGNOSIS — E78.49 OTHER HYPERLIPIDEMIA: Primary | ICD-10-CM

## 2024-12-17 PROCEDURE — 99214 OFFICE O/P EST MOD 30 MIN: CPT | Performed by: FAMILY MEDICINE

## 2024-12-17 PROCEDURE — G2211 COMPLEX E/M VISIT ADD ON: HCPCS | Performed by: FAMILY MEDICINE

## 2024-12-17 NOTE — ASSESSMENT & PLAN NOTE
Well-controlled.  Continue on Crestor 5 mg daily.  We will continue to monitor.  Orders:  •  CBC and differential; Future  •  Comprehensive metabolic panel; Future  •  Lipid Panel with Direct LDL reflex; Future  •  TSH, 3rd generation with Free T4 reflex; Future

## 2024-12-17 NOTE — ASSESSMENT & PLAN NOTE
Discussed about low-carb diet.  Continue monitor fasting glucose and A1c.  Orders:  •  Hemoglobin A1C; Future

## 2025-02-16 DIAGNOSIS — E78.49 OTHER HYPERLIPIDEMIA: ICD-10-CM

## 2025-02-17 RX ORDER — ROSUVASTATIN CALCIUM 5 MG/1
5 TABLET, COATED ORAL DAILY
Qty: 90 TABLET | Refills: 1 | Status: SHIPPED | OUTPATIENT
Start: 2025-02-17

## 2025-06-03 ENCOUNTER — OFFICE VISIT (OUTPATIENT)
Dept: FAMILY MEDICINE CLINIC | Facility: CLINIC | Age: 60
End: 2025-06-03
Payer: MEDICARE

## 2025-06-03 ENCOUNTER — APPOINTMENT (OUTPATIENT)
Dept: LAB | Age: 60
End: 2025-06-03
Payer: MEDICARE

## 2025-06-03 VITALS
WEIGHT: 140 LBS | BODY MASS INDEX: 20.04 KG/M2 | HEART RATE: 70 BPM | OXYGEN SATURATION: 95 % | DIASTOLIC BLOOD PRESSURE: 80 MMHG | SYSTOLIC BLOOD PRESSURE: 124 MMHG | TEMPERATURE: 97.6 F | HEIGHT: 70 IN | RESPIRATION RATE: 16 BRPM

## 2025-06-03 DIAGNOSIS — E78.49 OTHER HYPERLIPIDEMIA: ICD-10-CM

## 2025-06-03 DIAGNOSIS — H11.31 SUBCONJUNCTIVAL HEMORRHAGE OF RIGHT EYE: Primary | ICD-10-CM

## 2025-06-03 DIAGNOSIS — R73.9 HYPERGLYCEMIA: ICD-10-CM

## 2025-06-03 DIAGNOSIS — Z12.5 PROSTATE CANCER SCREENING: ICD-10-CM

## 2025-06-03 DIAGNOSIS — J30.89 NON-SEASONAL ALLERGIC RHINITIS DUE TO OTHER ALLERGIC TRIGGER: ICD-10-CM

## 2025-06-03 LAB
ALBUMIN SERPL BCG-MCNC: 4.4 G/DL (ref 3.5–5)
ALP SERPL-CCNC: 85 U/L (ref 34–104)
ALT SERPL W P-5'-P-CCNC: 24 U/L (ref 7–52)
ANION GAP SERPL CALCULATED.3IONS-SCNC: 4 MMOL/L (ref 4–13)
AST SERPL W P-5'-P-CCNC: 23 U/L (ref 13–39)
BASOPHILS # BLD AUTO: 0.07 THOUSANDS/ÂΜL (ref 0–0.1)
BASOPHILS NFR BLD AUTO: 1 % (ref 0–1)
BILIRUB SERPL-MCNC: 0.61 MG/DL (ref 0.2–1)
BUN SERPL-MCNC: 13 MG/DL (ref 5–25)
CALCIUM SERPL-MCNC: 9.7 MG/DL (ref 8.4–10.2)
CHLORIDE SERPL-SCNC: 107 MMOL/L (ref 96–108)
CHOLEST SERPL-MCNC: 153 MG/DL (ref ?–200)
CO2 SERPL-SCNC: 30 MMOL/L (ref 21–32)
CREAT SERPL-MCNC: 0.83 MG/DL (ref 0.6–1.3)
EOSINOPHIL # BLD AUTO: 0.12 THOUSAND/ÂΜL (ref 0–0.61)
EOSINOPHIL NFR BLD AUTO: 2 % (ref 0–6)
ERYTHROCYTE [DISTWIDTH] IN BLOOD BY AUTOMATED COUNT: 13.2 % (ref 11.6–15.1)
EST. AVERAGE GLUCOSE BLD GHB EST-MCNC: 123 MG/DL
GFR SERPL CREATININE-BSD FRML MDRD: 95 ML/MIN/1.73SQ M
GLUCOSE P FAST SERPL-MCNC: 101 MG/DL (ref 65–99)
HBA1C MFR BLD: 5.9 %
HCT VFR BLD AUTO: 47.4 % (ref 36.5–49.3)
HDLC SERPL-MCNC: 56 MG/DL
HGB BLD-MCNC: 14.9 G/DL (ref 12–17)
IMM GRANULOCYTES # BLD AUTO: 0.01 THOUSAND/UL (ref 0–0.2)
IMM GRANULOCYTES NFR BLD AUTO: 0 % (ref 0–2)
LDLC SERPL CALC-MCNC: 80 MG/DL (ref 0–100)
LYMPHOCYTES # BLD AUTO: 1.23 THOUSANDS/ÂΜL (ref 0.6–4.47)
LYMPHOCYTES NFR BLD AUTO: 18 % (ref 14–44)
MCH RBC QN AUTO: 30 PG (ref 26.8–34.3)
MCHC RBC AUTO-ENTMCNC: 31.4 G/DL (ref 31.4–37.4)
MCV RBC AUTO: 96 FL (ref 82–98)
MONOCYTES # BLD AUTO: 0.53 THOUSAND/ÂΜL (ref 0.17–1.22)
MONOCYTES NFR BLD AUTO: 8 % (ref 4–12)
NEUTROPHILS # BLD AUTO: 4.95 THOUSANDS/ÂΜL (ref 1.85–7.62)
NEUTS SEG NFR BLD AUTO: 71 % (ref 43–75)
NRBC BLD AUTO-RTO: 0 /100 WBCS
PLATELET # BLD AUTO: 236 THOUSANDS/UL (ref 149–390)
PMV BLD AUTO: 11.3 FL (ref 8.9–12.7)
POTASSIUM SERPL-SCNC: 4.6 MMOL/L (ref 3.5–5.3)
PROT SERPL-MCNC: 7.3 G/DL (ref 6.4–8.4)
PSA SERPL-MCNC: 2.47 NG/ML (ref 0–4)
RBC # BLD AUTO: 4.96 MILLION/UL (ref 3.88–5.62)
SODIUM SERPL-SCNC: 141 MMOL/L (ref 135–147)
TRIGL SERPL-MCNC: 84 MG/DL (ref ?–150)
TSH SERPL DL<=0.05 MIU/L-ACNC: 2.45 UIU/ML (ref 0.45–4.5)
WBC # BLD AUTO: 6.91 THOUSAND/UL (ref 4.31–10.16)

## 2025-06-03 PROCEDURE — G0103 PSA SCREENING: HCPCS

## 2025-06-03 PROCEDURE — 36415 COLL VENOUS BLD VENIPUNCTURE: CPT

## 2025-06-03 PROCEDURE — G2211 COMPLEX E/M VISIT ADD ON: HCPCS | Performed by: FAMILY MEDICINE

## 2025-06-03 PROCEDURE — 80061 LIPID PANEL: CPT

## 2025-06-03 PROCEDURE — 84443 ASSAY THYROID STIM HORMONE: CPT

## 2025-06-03 PROCEDURE — 85025 COMPLETE CBC W/AUTO DIFF WBC: CPT

## 2025-06-03 PROCEDURE — 99213 OFFICE O/P EST LOW 20 MIN: CPT | Performed by: FAMILY MEDICINE

## 2025-06-03 PROCEDURE — 80053 COMPREHEN METABOLIC PANEL: CPT

## 2025-06-03 PROCEDURE — 83036 HEMOGLOBIN GLYCOSYLATED A1C: CPT

## 2025-06-03 NOTE — ASSESSMENT & PLAN NOTE
Patient is asymptomatic and requires no treatment. Hemorrhage will resolve over time.  Patient can use OTC lubricating eye drops and cold/warm gentle compress if experiencing any irritation.

## 2025-06-03 NOTE — PROGRESS NOTES
"Name: Mike Perez      : 1965      MRN: [id number]  Encounter Provider: Annmarie Reynolds MD  Encounter Date: 6/3/2025   [facility]  :  Assessment & Plan  Subconjunctival hemorrhage of right eye  Patient is asymptomatic and requires no treatment. Hemorrhage will resolve over time.  Patient can use OTC lubricating eye drops and cold/warm gentle compress if experiencing any irritation.        Non-seasonal allergic rhinitis due to other allergic trigger  Stable on Allegra.  Continue same.  We will continue to monitor.              History of Present Illness   Patient is a 59 year old male with a PMHX of HLD and allergies presenting to the primary care office for a \"burst blood vessel\" in his right eye for 1-2 days. Patient first noticed it on Monday morning after waking up and does not remember any inciting event. Patient thinks it happened in his sleep and possibly rubbed his eye too hard. Patient denies any vision changes, pain, irritation, crusting, or drainage. Patient denies taking any blood thinning medications. Patient denies a history of HTN and his blood pressure at today's visit was 124/80.       Review of Systems   Constitutional:  Negative for fever.   HENT:  Negative for congestion and sinus pressure.    Eyes:  Positive for redness (medial aspect of the right eye). Negative for pain, discharge, itching and visual disturbance.   Respiratory:  Negative for shortness of breath.    Cardiovascular:  Negative for chest pain.       Objective   /80 (BP Location: Left arm, Patient Position: Sitting, Cuff Size: Adult)   Pulse 70   Temp 97.6 °F (36.4 °C) (Tympanic)   Resp 16   Ht 5' 10\" (1.778 m)   Wt 63.5 kg (140 lb)   SpO2 95%   BMI 20.09 kg/m²      Physical Exam  Vitals and nursing note reviewed.   Constitutional:       General: He is not in acute distress.     Appearance: Normal appearance. He is not ill-appearing.   HENT:      Head: " Normocephalic and atraumatic.      Nose: Nose normal.     Eyes:      General: Lids are normal. Vision grossly intact. No scleral icterus.        Right eye: No discharge.         Left eye: No discharge.      Extraocular Movements: Extraocular movements intact.      Right eye: Normal extraocular motion.      Left eye: Normal extraocular motion.      Conjunctiva/sclera:      Right eye: Right conjunctiva is not injected. Hemorrhage present.      Left eye: Left conjunctiva is not injected. No hemorrhage.     Pupils: Pupils are equal, round, and reactive to light.     Pulmonary:      Effort: Pulmonary effort is normal. No respiratory distress.     Neurological:      Mental Status: He is alert and oriented to person, place, and time.

## 2025-06-04 ENCOUNTER — RESULTS FOLLOW-UP (OUTPATIENT)
Dept: FAMILY MEDICINE CLINIC | Facility: CLINIC | Age: 60
End: 2025-06-04

## 2025-06-13 ENCOUNTER — RA CDI HCC (OUTPATIENT)
Dept: OTHER | Facility: HOSPITAL | Age: 60
End: 2025-06-13

## 2025-07-01 ENCOUNTER — OFFICE VISIT (OUTPATIENT)
Dept: FAMILY MEDICINE CLINIC | Facility: CLINIC | Age: 60
End: 2025-07-01
Payer: MEDICARE

## 2025-07-01 VITALS
HEIGHT: 70 IN | DIASTOLIC BLOOD PRESSURE: 84 MMHG | SYSTOLIC BLOOD PRESSURE: 128 MMHG | RESPIRATION RATE: 16 BRPM | WEIGHT: 143.4 LBS | HEART RATE: 59 BPM | OXYGEN SATURATION: 98 % | TEMPERATURE: 97.8 F | BODY MASS INDEX: 20.53 KG/M2

## 2025-07-01 DIAGNOSIS — Z00.00 MEDICARE ANNUAL WELLNESS VISIT, SUBSEQUENT: ICD-10-CM

## 2025-07-01 DIAGNOSIS — R73.9 HYPERGLYCEMIA: ICD-10-CM

## 2025-07-01 DIAGNOSIS — R41.3 MEMORY DEFICIT: ICD-10-CM

## 2025-07-01 DIAGNOSIS — E78.49 OTHER HYPERLIPIDEMIA: Primary | ICD-10-CM

## 2025-07-01 DIAGNOSIS — H61.21 HEARING LOSS OF RIGHT EAR DUE TO CERUMEN IMPACTION: ICD-10-CM

## 2025-07-01 PROCEDURE — G0444 DEPRESSION SCREEN ANNUAL: HCPCS | Performed by: FAMILY MEDICINE

## 2025-07-01 PROCEDURE — G2211 COMPLEX E/M VISIT ADD ON: HCPCS | Performed by: FAMILY MEDICINE

## 2025-07-01 PROCEDURE — G0439 PPPS, SUBSEQ VISIT: HCPCS | Performed by: FAMILY MEDICINE

## 2025-07-01 PROCEDURE — G0537 PR RISK ASCVD TST ONCE PR 12 MO: HCPCS | Performed by: FAMILY MEDICINE

## 2025-07-01 PROCEDURE — 99214 OFFICE O/P EST MOD 30 MIN: CPT | Performed by: FAMILY MEDICINE

## 2025-07-01 RX ORDER — DONEPEZIL HYDROCHLORIDE 10 MG/1
10 TABLET, FILM COATED ORAL
Qty: 90 TABLET | Refills: 1 | Status: SHIPPED | OUTPATIENT
Start: 2025-07-01

## 2025-07-01 RX ORDER — ROSUVASTATIN CALCIUM 5 MG/1
5 TABLET, COATED ORAL DAILY
Qty: 90 TABLET | Refills: 1 | Status: SHIPPED | OUTPATIENT
Start: 2025-07-01

## 2025-07-01 NOTE — ASSESSMENT & PLAN NOTE
Not well-controlled.  It was discussed about low-carb diet and regular exercise.  Continue to monitor fasting glucose and A1c.  Orders:  •  Hemoglobin A1C; Future

## 2025-07-01 NOTE — PROGRESS NOTES
Name: Mike Perez      : 1965      MRN: [id number]  Encounter Provider: Annmarie Reynolds MD  Encounter Date: 2025   Encounter department: [facility]  :  Assessment & Plan  Other hyperlipidemia  Well-controlled.  Continue on Crestor 5 mg daily.  We will continue to monitor.  Orders:  •  rosuvastatin (CRESTOR) 5 mg tablet; Take 1 tablet (5 mg total) by mouth daily  •  CBC and differential; Future  •  Comprehensive metabolic panel; Future  •  Lipid Panel with Direct LDL reflex; Future  •  TSH, 3rd generation with Free T4 reflex; Future    Hyperglycemia  Not well-controlled.  It was discussed about low-carb diet and regular exercise.  Continue to monitor fasting glucose and A1c.  Orders:  •  Hemoglobin A1C; Future    Memory deficit  Continue on Aricept 10 mg daily.  Continue to monitor.  Orders:  •  donepezil (ARICEPT) 10 mg tablet; Take 1 tablet (10 mg total) by mouth daily at bedtime    Medicare annual wellness visit, subsequent  It was discussed about immunizations, diet, exercise and safety measures.       Hearing loss of right ear due to cerumen impaction  Removed using curette.          Preventive health issues were discussed with patient, and age appropriate screening tests were ordered as noted in patient's After Visit Summary. Personalized health advice and appropriate referrals for health education or preventive services given if needed, as noted in patient's After Visit Summary.    History of Present Illness     Is here today for follow-up multiple medical problems.  He has been taking his medications.  Denies any side effect.  He had blood work done recently showed cholesterol is well-controlled.  His A1c elevated at 5.9.  He has not been watching his diet very well.  He denies any other complaint.       Patient Care Team:  Annmarie Reynolds MD as PCP - General (Family Medicine)    Review of Systems   Constitutional:  Negative for chills and fever.   HENT:  Negative  for trouble swallowing.    Eyes:  Negative for visual disturbance.   Respiratory:  Negative for cough and shortness of breath.    Cardiovascular:  Negative for chest pain and palpitations.   Gastrointestinal:  Negative for abdominal pain, blood in stool and vomiting.   Endocrine: Negative for cold intolerance and heat intolerance.   Genitourinary:  Negative for difficulty urinating and dysuria.   Musculoskeletal:  Negative for gait problem.   Skin:  Negative for rash.   Neurological:  Negative for dizziness, syncope and headaches.   Hematological:  Negative for adenopathy.   Psychiatric/Behavioral:  Negative for behavioral problems.      Medical History Reviewed by provider this encounter:  Tobacco  Allergies  Meds  Problems  Med Hx  Surg Hx  Fam Hx       Annual Wellness Visit Questionnaire   Mike is here for his Subsequent Wellness visit.     Health Risk Assessment:   Patient rates overall health as excellent. Patient feels that their physical health rating is slightly better. Patient is satisfied with their life. Eyesight was rated as slightly worse. Hearing was rated as slightly worse. Patient feels that their emotional and mental health rating is same. Patients states they are never, rarely angry. Patient states they are sometimes unusually tired/fatigued. Pain experienced in the last 7 days has been none. Patient states that he has experienced no weight loss or gain in last 6 months.     Depression Screening:   PHQ-2 Score: 0      Fall Risk Screening:   In the past year, patient has experienced: no history of falling in past year      Home Safety:  Patient does not have trouble with stairs inside or outside of their home. Patient has working smoke alarms and has working carbon monoxide detector. Home safety hazards include: none.     Nutrition:   Current diet is Regular.     Medications:   Patient is not currently taking any over-the-counter supplements. Patient is able to manage medications.      Activities of Daily Living (ADLs)/Instrumental Activities of Daily Living (IADLs):   Walk and transfer into and out of bed and chair?: Yes  Dress and groom yourself?: Yes    Bathe or shower yourself?: Yes    Feed yourself? Yes  Do your laundry/housekeeping?: Yes  Manage your money, pay your bills and track your expenses?: No  Make your own meals?: No    Do your own shopping?: No    Previous Hospitalizations:   Any hospitalizations or ED visits within the last 12 months?: No      Advance Care Planning:   Living will: Yes    Durable POA for healthcare: Yes    Advanced directive: Yes      Cognitive Screening:   Provider or family/friend/caregiver concerned regarding cognition?: No    Preventive Screenings      Cardiovascular Screening:    General: Screening Not Indicated and History Lipid Disorder      Diabetes Screening:     General: Screening Current      Colorectal Cancer Screening:     General: Screening Current      Prostate Cancer Screening:    General: Screening Current      Osteoporosis Screening:    General: Risks and Benefits Discussed      Abdominal Aortic Aneurysm (AAA) Screening:    Risk factors include: tobacco use        General: Risks and Benefits Discussed      Lung Cancer Screening:     General: Risks and Benefits Discussed      Hepatitis C Screening:    General: Risks and Benefits Discussed    Immunizations:  - Immunizations due: Prevnar 20    Cardiovascular Risk Assessment:  Patient does not have underlying ASCVD and their cardiovascular risk was assessed today. Their cardiovascular risk factors include: hyperlipidemia.     The 10-year ASCVD risk score (Esvin NESBITT, et al., 2019) is: 6.2%    Values used to calculate the score:      Age: 59 years      Clincally relevant sex: Male      Is Non- : No      Diabetic: No      Tobacco smoker: No      Systolic Blood Pressure: 128 mmHg      Is BP treated: No      HDL Cholesterol: 56 mg/dL      Total Cholesterol: 153 mg/dL    Time  "spent assessing cardiovascular risk: 5 minutes. ASCVD risk is 6.2%    Screening, Brief Intervention, and Referral to Treatment (SBIRT)     Screening  Typical number of drinks in a day: 0  Typical number of drinks in a week: 0  Interpretation: Low risk drinking behavior.    Brief Intervention  Alcohol & drug use screenings were reviewed. No concerns regarding substance use disorder identified.     Annual Depression Screening  Time spent screening and evaluating the patient for depression during today's encounter was 7 minutes.    Other Counseling Topics:   Calcium and vitamin D intake and regular weightbearing exercise.        No results found.    Objective   /84 (BP Location: Left arm, Patient Position: Sitting, Cuff Size: Large)   Pulse 59   Temp 97.8 °F (36.6 °C) (Tympanic)   Resp 16   Ht 5' 10\" (1.778 m)   Wt 65 kg (143 lb 6.4 oz)   SpO2 98%   BMI 20.58 kg/m²     Physical Exam  Vitals and nursing note reviewed.   Constitutional:       Appearance: He is well-developed.   HENT:      Head: Normocephalic and atraumatic.      Right Ear: There is impacted cerumen.     Eyes:      Pupils: Pupils are equal, round, and reactive to light.       Cardiovascular:      Rate and Rhythm: Normal rate and regular rhythm.      Heart sounds: Normal heart sounds.   Pulmonary:      Effort: Pulmonary effort is normal.      Breath sounds: Normal breath sounds.   Abdominal:      General: Bowel sounds are normal.      Palpations: Abdomen is soft.     Musculoskeletal:      Cervical back: Normal range of motion and neck supple.   Lymphadenopathy:      Cervical: No cervical adenopathy.     Skin:     General: Skin is warm.      Findings: No rash.     Neurological:      Mental Status: He is alert and oriented to person, place, and time.         "

## 2025-07-01 NOTE — ASSESSMENT & PLAN NOTE
Continue on Aricept 10 mg daily.  Continue to monitor.  Orders:  •  donepezil (ARICEPT) 10 mg tablet; Take 1 tablet (10 mg total) by mouth daily at bedtime

## 2025-07-01 NOTE — ASSESSMENT & PLAN NOTE
Well-controlled.  Continue on Crestor 5 mg daily.  We will continue to monitor.  Orders:  •  rosuvastatin (CRESTOR) 5 mg tablet; Take 1 tablet (5 mg total) by mouth daily  •  CBC and differential; Future  •  Comprehensive metabolic panel; Future  •  Lipid Panel with Direct LDL reflex; Future  •  TSH, 3rd generation with Free T4 reflex; Future

## 2025-07-31 PROBLEM — Z00.00 MEDICARE ANNUAL WELLNESS VISIT, SUBSEQUENT: Status: RESOLVED | Noted: 2023-06-14 | Resolved: 2025-07-31

## 2025-07-31 PROBLEM — H61.21 HEARING LOSS OF RIGHT EAR DUE TO CERUMEN IMPACTION: Status: RESOLVED | Noted: 2025-07-01 | Resolved: 2025-07-31

## 2025-08-18 DIAGNOSIS — E78.49 OTHER HYPERLIPIDEMIA: ICD-10-CM

## 2025-08-20 RX ORDER — ROSUVASTATIN CALCIUM 5 MG/1
5 TABLET, COATED ORAL DAILY
Qty: 90 TABLET | Refills: 1 | Status: SHIPPED | OUTPATIENT
Start: 2025-08-20